# Patient Record
Sex: FEMALE | Race: WHITE
[De-identification: names, ages, dates, MRNs, and addresses within clinical notes are randomized per-mention and may not be internally consistent; named-entity substitution may affect disease eponyms.]

---

## 2018-07-27 ENCOUNTER — HOSPITAL ENCOUNTER (EMERGENCY)
Dept: HOSPITAL 83 - ED | Age: 23
Discharge: HOME | End: 2018-07-27
Payer: COMMERCIAL

## 2018-07-27 VITALS — WEIGHT: 170 LBS | HEIGHT: 61.97 IN | BODY MASS INDEX: 31.28 KG/M2
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
## 2018-07-27 DIAGNOSIS — S22.32XA: Primary | ICD-10-CM
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
## 2018-07-27 DIAGNOSIS — F17.200: ICD-10-CM
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
## 2018-07-27 LAB
ALBUMIN SERPL-MCNC: 4 GM/DL (ref 3.1–4.5)
ALP SERPL-CCNC: 71 U/L (ref 45–117)
ALT SERPL W P-5'-P-CCNC: 94 U/L (ref 12–78)
APPEARANCE UR: (no result)
AST SERPL-CCNC: 116 IU/L (ref 3–35)
B-HCG SERPL-ACNC: NEGATIVE
BACTERIA #/AREA URNS HPF: (no result) /[HPF]
BASOPHILS # BLD AUTO: 0 10*3/UL (ref 0–0.1)
BASOPHILS NFR BLD AUTO: 0.3 % (ref 0–1)
BILIRUB UR QL STRIP: NEGATIVE
BUN SERPL-MCNC: 17 MG/DL (ref 7–24)
CHLORIDE SERPL-SCNC: 105 MMOL/L (ref 98–107)
COLOR UR: YELLOW
CREAT SERPL-MCNC: 1.03 MG/DL (ref 0.55–1.02)
EOSINOPHIL # BLD AUTO: 0.1 10*3/UL (ref 0–0.4)
EOSINOPHIL # BLD AUTO: 1 % (ref 1–4)
ERYTHROCYTE [DISTWIDTH] IN BLOOD BY AUTOMATED COUNT: 12.9 % (ref 0–14.5)
ETHANOL SERPL-MCNC: < 3 MG/DL (ref ?–3)
GLUCOSE UR QL: NEGATIVE
HCT VFR BLD AUTO: 45.4 % (ref 37–47)
HGB BLD-MCNC: 14.7 G/DL (ref 12–16)
HGB UR QL STRIP: (no result)
INR BLD: 0.9 (ref 2–3.5)
KETONES UR QL STRIP: (no result)
LEUKOCYTE ESTERASE UR QL STRIP: (no result)
LIPASE SERPL-CCNC: 165 U/L (ref 73–393)
LYMPHOCYTES # BLD AUTO: 2.5 10*3/UL (ref 1.3–4.4)
LYMPHOCYTES NFR BLD AUTO: 18.1 % (ref 27–41)
MCH RBC QN AUTO: 29 PG (ref 27–31)
MCHC RBC AUTO-ENTMCNC: 32.4 G/DL (ref 33–37)
MCV RBC AUTO: 89.5 FL (ref 81–99)
MONOCYTES # BLD AUTO: 0.6 10*3/UL (ref 0.1–1)
MONOCYTES NFR BLD MANUAL: 4.7 % (ref 3–9)
NEUT #: 10.2 10*3/UL (ref 2.3–7.9)
NEUT %: 75.4 % (ref 47–73)
NITRITE UR QL STRIP: NEGATIVE
NRBC BLD QL AUTO: 0 % (ref 0–0)
PH UR STRIP: 5.5 [PH] (ref 5–9)
PLATELET # BLD AUTO: 254 10*3/UL (ref 130–400)
PMV BLD AUTO: 10.6 FL (ref 9.6–12.3)
POTASSIUM SERPL-SCNC: 3.1 MMOL/L (ref 3.5–5.1)
PROT SERPL-MCNC: 8 GM/DL (ref 6.4–8.2)
RBC # BLD AUTO: 5.07 10*6/UL (ref 4.1–5.1)
SODIUM SERPL-SCNC: 139 MMOL/L (ref 136–145)
SP GR UR: >= 1.03 (ref 1–1.03)
UROBILINOGEN UR STRIP-MCNC: 0.2 E.U./DL (ref 0.2–1)
WBC #/AREA URNS HPF: (no result) WBC/HPF (ref 0–5)
WBC NRBC COR # BLD AUTO: 13.6 10*3/UL (ref 4.8–10.8)

## 2018-08-10 ENCOUNTER — HOSPITAL ENCOUNTER (EMERGENCY)
Dept: HOSPITAL 83 - ED | Age: 23
End: 2018-08-10
Payer: COMMERCIAL

## 2018-08-10 VITALS — HEIGHT: 61.97 IN | BODY MASS INDEX: 31.28 KG/M2 | WEIGHT: 170 LBS
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
## 2018-08-10 DIAGNOSIS — S20.162A: Primary | ICD-10-CM
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
## 2018-08-10 DIAGNOSIS — L03.313: ICD-10-CM

## 2019-11-13 ENCOUNTER — HOSPITAL ENCOUNTER (EMERGENCY)
Dept: HOSPITAL 83 - ED | Age: 24
Discharge: HOME | End: 2019-11-13
Payer: COMMERCIAL

## 2019-11-13 VITALS — HEIGHT: 63.98 IN | BODY MASS INDEX: 31.58 KG/M2 | WEIGHT: 185 LBS

## 2019-11-13 DIAGNOSIS — Z79.2: ICD-10-CM

## 2019-11-13 DIAGNOSIS — K64.4: Primary | ICD-10-CM

## 2019-11-13 DIAGNOSIS — F17.200: ICD-10-CM

## 2019-11-13 DIAGNOSIS — K29.70: ICD-10-CM

## 2020-02-15 ENCOUNTER — HOSPITAL ENCOUNTER (EMERGENCY)
Dept: HOSPITAL 83 - ED | Age: 25
Discharge: HOME | End: 2020-02-15
Payer: COMMERCIAL

## 2020-02-15 VITALS — HEIGHT: 61.97 IN | BODY MASS INDEX: 34.78 KG/M2 | WEIGHT: 189 LBS

## 2020-02-15 DIAGNOSIS — Z79.2: ICD-10-CM

## 2020-02-15 DIAGNOSIS — H66.91: Primary | ICD-10-CM

## 2020-02-15 DIAGNOSIS — Z79.899: ICD-10-CM

## 2020-02-15 DIAGNOSIS — H60.91: ICD-10-CM

## 2020-02-15 DIAGNOSIS — F17.200: ICD-10-CM

## 2020-05-05 ENCOUNTER — HOSPITAL ENCOUNTER (EMERGENCY)
Dept: HOSPITAL 83 - ED | Age: 25
Discharge: HOME | End: 2020-05-05
Payer: COMMERCIAL

## 2020-05-05 VITALS — HEIGHT: 61.97 IN | BODY MASS INDEX: 33.13 KG/M2 | WEIGHT: 180 LBS

## 2020-05-05 DIAGNOSIS — Z79.899: ICD-10-CM

## 2020-05-05 DIAGNOSIS — S90.451A: Primary | ICD-10-CM
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
## 2021-03-19 ENCOUNTER — HOSPITAL ENCOUNTER (OUTPATIENT)
Dept: HOSPITAL 83 - US | Age: 26
Discharge: HOME | End: 2021-03-19
Attending: NURSE PRACTITIONER
Payer: COMMERCIAL

## 2021-03-19 DIAGNOSIS — Z3A.10: ICD-10-CM

## 2021-03-19 DIAGNOSIS — Z34.81: Primary | ICD-10-CM

## 2021-04-19 ENCOUNTER — HOSPITAL ENCOUNTER (OUTPATIENT)
Dept: HOSPITAL 83 - COVID19 | Age: 26
Discharge: HOME | End: 2021-04-19
Attending: INTERNAL MEDICINE
Payer: COMMERCIAL

## 2021-04-19 DIAGNOSIS — Z20.822: Primary | ICD-10-CM

## 2021-05-14 ENCOUNTER — HOSPITAL ENCOUNTER (OUTPATIENT)
Dept: HOSPITAL 83 - US | Age: 26
End: 2021-05-14
Attending: NURSE PRACTITIONER
Payer: COMMERCIAL

## 2021-05-14 DIAGNOSIS — Z34.82: Primary | ICD-10-CM

## 2021-05-14 DIAGNOSIS — Z3A.18: ICD-10-CM

## 2021-07-15 ENCOUNTER — ROUTINE PRENATAL (OUTPATIENT)
Dept: OBGYN CLINIC | Age: 26
End: 2021-07-15
Payer: MEDICAID

## 2021-07-15 ENCOUNTER — ANCILLARY PROCEDURE (OUTPATIENT)
Dept: OBGYN CLINIC | Age: 26
End: 2021-07-15
Payer: MEDICAID

## 2021-07-15 VITALS
HEIGHT: 63 IN | DIASTOLIC BLOOD PRESSURE: 75 MMHG | BODY MASS INDEX: 37.21 KG/M2 | WEIGHT: 210 LBS | SYSTOLIC BLOOD PRESSURE: 116 MMHG | HEART RATE: 89 BPM

## 2021-07-15 DIAGNOSIS — O99.340 DEPRESSION AFFECTING PREGNANCY: ICD-10-CM

## 2021-07-15 DIAGNOSIS — O36.5920 POOR FETAL GROWTH AFFECTING MANAGEMENT OF MOTHER IN SECOND TRIMESTER, SINGLE OR UNSPECIFIED FETUS: ICD-10-CM

## 2021-07-15 DIAGNOSIS — F12.90 MARIJUANA USE: ICD-10-CM

## 2021-07-15 DIAGNOSIS — F32.A DEPRESSION AFFECTING PREGNANCY: ICD-10-CM

## 2021-07-15 DIAGNOSIS — Z92.89 HISTORY OF BLOOD TRANSFUSION: ICD-10-CM

## 2021-07-15 DIAGNOSIS — Z80.41 FAMILY HISTORY OF OVARIAN CANCER: ICD-10-CM

## 2021-07-15 DIAGNOSIS — O09.292 HISTORY OF OLIGOHYDRAMNIOS IN PRIOR PREGNANCY, CURRENTLY PREGNANT IN SECOND TRIMESTER: ICD-10-CM

## 2021-07-15 DIAGNOSIS — Z3A.27 27 WEEKS GESTATION OF PREGNANCY: Primary | ICD-10-CM

## 2021-07-15 DIAGNOSIS — O21.9 NAUSEA AND VOMITING DURING PREGNANCY: ICD-10-CM

## 2021-07-15 LAB
GLUCOSE URINE, POC: NEGATIVE
PROTEIN UA: POSITIVE

## 2021-07-15 PROCEDURE — 81002 URINALYSIS NONAUTO W/O SCOPE: CPT | Performed by: OBSTETRICS & GYNECOLOGY

## 2021-07-15 PROCEDURE — 99203 OFFICE O/P NEW LOW 30 MIN: CPT | Performed by: OBSTETRICS & GYNECOLOGY

## 2021-07-15 PROCEDURE — 76805 OB US >/= 14 WKS SNGL FETUS: CPT | Performed by: OBSTETRICS & GYNECOLOGY

## 2021-07-15 PROCEDURE — 76820 UMBILICAL ARTERY ECHO: CPT | Performed by: OBSTETRICS & GYNECOLOGY

## 2021-07-15 PROCEDURE — 99244 OFF/OP CNSLTJ NEW/EST MOD 40: CPT | Performed by: OBSTETRICS & GYNECOLOGY

## 2021-07-15 RX ORDER — LANOLIN ALCOHOL/MO/W.PET/CERES
25 CREAM (GRAM) TOPICAL DAILY
Status: ON HOLD | COMMUNITY
End: 2021-10-18 | Stop reason: HOSPADM

## 2021-07-15 RX ORDER — SERTRALINE HYDROCHLORIDE 20 MG/ML
10 SOLUTION ORAL DAILY
COMMUNITY

## 2021-07-15 NOTE — PROGRESS NOTES
July 15, 2021    MD Tayler Quezada [address]     RE:  Concepcion Covington  : 1995   AGE: 32 y.o. This report has been created using voice recognition software. It may contain errors which are inherent in voice recognition technology. Dear Dr. Beto Owen:    I had the pleasure of meeting with Ms. Olvera for a consultation. As you know, Ms. Olvera is a 32 y.o.  at 27w4d (LMP = 10 wk US) who was referred to our office for counseling secondary to a history of a low amniotic fluid index. The patient's medical records and laboratory workup were reviewed. The patient's history was reviewed and outlined below. Today, Ms. Olvera reports that she feels well. She notes good fetal movement and denies any symptoms of leaking of fluid, vaginal bleeding, and/or contractions. She had a fetal ultrasound that was notable for the following. There is a single intrauterine gestation in a cephalic presentation with a heart rate of 137 beats per minute. The placenta is posterior. The composite gestational age is 34w1d. The estimated fetal weight is at the 28th percentile. The femur length is at the 60th percentile. The amniotic fluid index is 11.4 cm. The umbilical artery S/D is normal.          PAST OBSTETRICAL HISTORY:    2014 -- (GRACIELA 14) 38w5d  of 5lb 15 male ([address] [place] [address]). She was induced at 38w5d for a low ALEX. She had bleeding in the first trimester. She had a postpartum hemorrhage likely secondary to uterine atony for which she required a blood transfusion. She recalls receiving 2 units. She denies having any other complications with the pregnancy, delivery, and/or postpartum recovery. She reports that her son is living and well. All pregnancies are with the same partner. PAST GYNECOLOGICAL  HISTORY:  Negative for abnormal pap smears. Negative for sexually transmitted diseases. Negative for cervical LEEP / conization /cryosurgery.     Negative for uterine surgery. Negative for ovarian or tubal surgery. PAST MEDICAL HISTORY:    MEDICATIONS:  Vitamin B6, 25 mg daily  Unisom, daily  Prenatal vitamin  Zoloft, 20 mg daily    ILLNESSES:  Anxiety/depression    BLOOD TRANSFUSION:  Patient reports a history of a blood transfusion associated with her first delivery. IMMUNIZATIONS:  Up-to-date    SURGERIES:  None    HOSPITALIZATIONS:  Childbirth    ALLERGIES:  She denies having any medication, latex, and or shellfish allergies    SOCIAL HISTORY:  She is currently smoking one half a pack of cigarettes per day, she has cut back from 1 pack/day. She has also been using marijuana for appetite and nausea. She denies any alcohol use. She is a stay-at-home mother. FAMILY MEDICAL HISTORY:   Negative for congenital abnormalities, autism, genetic disease and mental retardation, not listed above. Cancer Mother -- ovarian cancer; FOB father -- Hodgkin's lymphoma; MGA -- breast cancer  CAD Father  CVA None  Congenital anomalies FOB son has cerebral palsy, and pectus excavatum (indented)  DM None  DVT Mother related to a PICC line and chemotherapy  Bleeding disorders None  Autoimmune disorders None    Review of Systems :   CONSTITUTIONAL : No fever, no chills   HEENT : No headache, no visual changes, no rhinorrhea, no sore throat   CARDIOVASCULAR : No pain, no palpitations, no edema   RESPIRATORY : No pain, no shortness of breath   GASTROINTESTINAL : No N/V, no D/C, no abdominal pain   GENITOURINARY : No dysuria, hematuria and no incontinence   MUSCULOSKELETAL : No myalgia, No back pain  NEUROLOGICAL : No numbness, no tingling, no tremors. No history of seizures  ALL OTHER SYSTEMS WERE REPORTED AS NEGATIVE.     PERTINENT PHYSICAL EXAMINATION:   /75 (Position: Sitting)   Pulse 89   Ht 5' 3\" (1.6 m)   Wt 210 lb (95.3 kg)   LMP 01/03/2021   BMI 37.20 kg/m²   Urine dipstick:   Negative for Glucose    Trace for Albumin    GENERAL:   The patient is a well developed, female who is alert cooperative and oriented times three in no acute distress. HEENT:  Normo cephalic and atraumatic. No facial edema. ABDOMEN:   Her uterus is gravid. She had no complaint of abdominal pain or tenderness. EXTREMITIES:  No peripheral edema is noted. A fetal ultrasound assessment was performed today. A report is enclosed for your review. Assessment & Plan:  32 y.o.  at 27w4d (LMP = 8 WK us) with:    1. Dating Review -- The patent reports that her mirena was pulled in 2020. Her menses were irregular initially after having the IUD removed. She reports that was sure of her LMP. The patient's first ultrasound was on 3/19/2021. The crown-rump length was consistent with 10 weeks 2 days and the GRACIELA was 10/13/2021. She had a follow-up ultrasound at 18 weeks 2 days. The composite gestational age was consistent with an GRACIELA of 10/13/2021. Thus, the patient's pregnancy is dated by a last menstrual period that I agreed with a 10-week ultrasound. 2.  History of low ALXE, improved -- The patient's prenatal records were reviewed. She had an ultrasound on 2021. The composite gestational age at this time was 18 weeks 2 days. The ALEX was noted to be low at 8.2 cm. The patient denied having any symptoms of vaginal bleeding or loss of fluid. Today but the patient's first ultrasound since 18 weeks gestation. The overall composite gestational age is 26 weeks 2 days and the ALEX was normal at 11.2 cm. The reassuring findings were reviewed with the patient. Given that the patient's prior pregnancy was complicated by oligohydramnios, she is at increased risk for having a low ALEX with this pregnancy. Thus, increased surveillance is recommended, please see below. 3.  Short femur length -- Today's ultrasound was reviewed with patient. The femur length was at the 6 percentile. . Femur length to abdominal circumference ratio was normal at 0.22.  The Again, there are risks associated with untreated depression including poor fetal growth and  delivery. The patient indicated that at this time, she did not feel that she could safely taper off of the medication. Thus, the benefit of treatment would outweigh risk at this time. Dose adjustment should be based on patient symptoms. Pregnant women exposed to antidepressants during pregnancy are encouraged to enroll in the [facility] Pregnancy Registry for Antidepressants (NPRAD). Women 25to 39years of age or their health care providers may contact the registry by calling [phone]. Enrollment should be done as early in pregnancy as possible. She was also counseled regarding the risks and benefits of these medications during breast-feeding. Sertraline and its active metabolite are present in breastmilk. The relative infant dose (RID) is estimated to be 0.5-3%. Typically, breast-feeding is considered acceptable when the RID is <10%. When the RID is >25%, breast-feeding should be avoided. Some studies have reported adverse events in breast-feeding infants following maternal use of sertraline. Incidence of mothers taking psychotropic medication should be monitored daily for changes in sleep, feeding patterns, and/or behavior. Infant growth and development should be monitored per protocol. SSRI use during pregnancy may cause delayed lactation. Sertraline is a first-line agent for postpartum depression in women who have breast-feeding. Women whose symptoms are well controlled with sertraline during pregnancy can continue use while breast-feeding if there are no other contraindications. Per the , the decision to breast-feed during therapy should take into account risks and benefits and be individualized to the patient. 7. History of a blood transfusion -- The patient reports a history of a blood transfusion following her delivery.   She reports that she had significant bleeding have any other developmental delays. He does not have any type of genetic diagnosis, per the patient's partner. Counseling was provided. Cerebral palsy (CP) is a heterogeneous group of conditions involving permanent, non progressive motor dysfunction that affects muscle tone, posture, and/or movement. CP is not typically progressive, however, the clinical expression may change over time as the central nervous system matures. Additional symptoms such as perception issues, intellectual disability, behavioral issues, seizure disorders, and developmental delays may occur with the primary motor abnormality. CP affects approximately 1000 live births. The prevalence is much higher in  infants and low birth weight infants. The etiology is multifactorial.  Risk factors for CP included  birth, low birth weight, maternal alcohol consumption, maternal smoking, obesity, congenital infections, antepartum hemorrhage, severe placental dysfunction, birth injury, genetic factors, and multiple gestation. Genetic conditions were not thought to be a cause of CP, however, recent studies have suggested a genetic contribution to CP risk. The identification of a genetic diagnosis does not replace the diagnosis of CP. This family history should be relayed to the pediatrician who cares for their child(mary anne). 6.  Family history of pectus excavatum -- The patient's family history is notable in that her partner's son was born with pectus excavated him. Her partner does not have this condition. As far as the patient knows, he does not have any associated genetic diagnosis or connective tissue disorder. She was counseled that this condition is associated with several genetic and connective tissue disorders.   In the absence of an associated diagnosis, it is generally sporadic and the exact etiology is thought to be multifactorial.  There have been reports of familial inheritance occurring in an autosomal dominant, autosomal recessive, and x-linked fashion. The patient does not believe that she has any other family members with this condition. Thus, in the absence of a known genetic disorder or connective tissue disease, I would not expect the risk to be significantly increased above that of the general population. However, if there is a genetic component that has not be identified, the recurrence risk could be as high as 50%. The patient was counseled to relay this family history to the pediatrician caring for her child as the condition can present later in life (infancy, at the time of puberty). 12.  Nausea and vomiting of pregnancy  -- The patients reports having nausea the entire pregnancy. She initially lost 8 to 10 pounds. She has gained this weight back. She has been smoking marijuana to improve her appetite. The patient reports daily symptoms of nausea and decreased appetite. She occasionally has emesis. She denies any signs of symptoms of dehydration. Precautions were reviewed. A baseline nutrition panel (CBC, CMP, magnesium, ferritin, folate, vitamin B12, vitamin D 25 [place]) was recommended. This was ordered today. She is using vitamin B6 and Unisom with some relief. She declined any additional treatments today. She was counseled to stop using marijuana for appetite improvement. --The patient was advised to call if she has any increased vaginal discharge, vaginal bleeding, contractions, abdominal pain, back pain or any new significant symptomatology prior to her next visit. I advised her that these are signs and symptoms of cervical change and require follow-up assessment when they occur. --I requested the patient return for a follow-up assessment in 3 weeks unless there is a clinical reason for her to return prior to that time. She is to call if she has any problems or questions prior to her next visit.  Further evaluation and management will be dependent on her clinical presentation and the results of her testing. --The patient is to continue to follow with you in your office for ongoing obstetric care. --The total time spent on today's visit was 60 minutes. This included preparation for the visit (i.e. reviewing prior external notes and test results), performance of a medically appropriate history and examination, counseling, orders for medications, tests or other procedures, and coordination of care. Greater than 50% of the time was spent face-to-face with the patient. This time is exclusive of procedures performed. I answered all of  the patient's questions to her satisfaction. I asked her to call if she had any additional questions prior to her next visit. --If you have any questions regarding her management, please contact me at your convenience and thank you for allowing me to participate in her care. Sincerely,        John Ware MD    [address] [place] [address]  [phone]    *All or parts of this note may have been generated using a voice recognition program. There may be typo, grammar, or Word substitution errors that have escaped my review of this note.

## 2021-07-15 NOTE — PATIENT INSTRUCTIONS
Please arrive for your scheduled appointment at least 15 minutes early with your actual insurance card+ a photo ID. Also if you need any refills ordered or have questions, it may take up 48 hours to reply. Please allow ample time for your refills. Call me when you use last refill. Thank you for your cooperation. Any questions contact Samanta at [phone]. If you are experiencing an emergency and need immediate help, call 911 or go to go emergency room or labor and delivery. if you are sick, not feeling well or have an infectious process going on please reschedule your appointment by calling [phone]. Also if any family members are not feeling well, please do not bring them to your appointment. We appreciate your cooperation. We are doing this in order to protect our pregnant mothers+ their babies. Call your primary obstetrician with bleeding, leaking of fluid, abdominal tenderness, headache, blurry vision, epigastric pain and increased urinary frequency. Patient Education        Weeks 26 to 30 of Your Pregnancy: Care Instructions  Overview     You are now entering your last trimester of pregnancy. Your baby is growing quickly. Mo Bolanos probably feel your baby moving around more often. Your doctor may ask you to count your baby's kicks. Your back may ache as your body gets used to your baby's size and length. If you haven't already had the Tdap shot during this pregnancy, talk to your doctor about getting it. It will help protect your  against pertussis infection. During this time, it's important to take care of yourself and pay attention to what your body needs. If you feel sexual, you can explore ways to be close with your partner that match your comfort and desire. Follow-up care is a key part of your treatment and safety. Be sure to make and go to all appointments, and call your doctor if you are having problems.  It's also a good idea to know your test results and keep a list of the medicines you take.  How can you care for yourself at home? Take it easy at work  · Take frequent breaks. If possible, stop working when you are tired, and rest during your lunch hour. · Take bathroom breaks every 2 hours. · Change positions often. If you sit for long periods, stand up and walk around. · When you stand for a long time, keep one foot on a low stool with your knee bent. After standing a lot, sit with your feet up. · Avoid fumes, chemicals, and tobacco smoke. Be sexual in your own way  · Having sex during pregnancy is okay, unless your doctor tells you not to. · You may be very interested in sex, or you may have no interest at all. · Your growing belly can make it hard to find a good position during intercourse. [place] and explore. · You may get cramps in your uterus when your partner touches your breasts. · A back rub may relieve the backache or cramps that sometimes follow orgasm. Learn about  labor  · Watch for signs of  labor. You may be going into labor if:  ? You have menstrual-like cramps, with or without nausea. ? You have about 6 or more contractions in 1 hour, even after you have had a glass of water and are resting. ? You have a low, dull backache that does not go away when you change your position. ? You have pain or pressure in your pelvis that comes and goes in a pattern. ? You have intestinal cramping or flu-like symptoms, with or without diarrhea.  ? You notice an increase or change in your vaginal discharge. Discharge may be heavy, mucus-like, watery, or streaked with blood. ? Your water breaks. · If you think you have  labor:  ? Drink 2 or 3 glasses of water or juice. Not drinking enough fluids can cause contractions. ? Stop what you are doing, and empty your bladder. Then lie down on your left side for at least 1 hour. ? While lying on your side, find your breast bone. Put your fingers in the soft spot just below it.  Move your fingers down toward your belly button to find the top of your uterus. Check to see if it is tight. ? Contractions can be weak or strong. Record your contractions for an hour. Time a contraction from the start of one contraction to the start of the next one.  ? Single or several strong contractions without a pattern are called [place] contractions. They are practice contractions but not the start of labor. They often stop if you change what you are doing. ? Call your doctor if you have regular contractions. Where can you learn more? Go to https://Ambio Healthleobardoeb.[employer]. org and sign in to your [employer] account. Enter I319 in the [employer] box to learn more about \"Weeks 26 to 30 of Your Pregnancy: Care Instructions. \"     If you do not have an account, please click on the \"Sign Up Now\" link. Current as of: October 8, 2020               Content Version: 12.9  © 2006-2021 [employer]. Care instructions adapted under license by [facility] ([facility]). If you have questions about a medical condition or this instruction, always ask your healthcare professional. [place] [address] any warranty or liability for your use of this information. Patient Education        Learning About When to Call Your Doctor During Pregnancy (After 20 Weeks)  Your Care Instructions  It's common to have concerns about what might be a problem during pregnancy. Although most pregnant women don't have any serious problems, it's important to know when to call your doctor if you have certain symptoms or signs of labor. These are general suggestions. Your doctor may give you some more information about when to call. When to call your doctor (after 20 weeks)  Call 911  anytime you think you may need emergency care. For example, call if:  · You have severe vaginal bleeding. · You have sudden, severe pain in your belly. · You passed out (lost consciousness). · You have a seizure.   · You see or feel the umbilical cord.  · You think you are about to deliver your baby and can't make it safely to the hospital.  Call your doctor now or seek immediate medical care if:  · You have vaginal bleeding. · You have belly pain. · You have a fever. · You have symptoms of preeclampsia, such as:  ? Sudden swelling of your face, hands, or feet. ? New vision problems (such as dimness, blurring, or seeing spots). ? A severe headache. · You have a sudden release of fluid from your vagina. (You think your water broke.)  · You think that you may be in labor. This means that you've had at least 6 contractions in an hour. · You notice that your baby has stopped moving or is moving much less than normal.  · You have symptoms of a urinary tract infection. These may include:  ? Pain or burning when you urinate. ? A frequent need to urinate without being able to pass much urine. ? Pain in the flank, which is just below the rib cage and above the waist on either side of the back. ? Blood in your urine. Watch closely for changes in your health, and be sure to contact your doctor if:  · You have vaginal discharge that smells bad. · You have skin changes, such as:  ? A rash. ? Itching. ? Yellow color to your skin. · You have other concerns about your pregnancy. If you have labor signs at 37 weeks or more  If you have signs of labor at 37 weeks or more, your doctor may tell you to call when your labor becomes more active. Symptoms of active labor include:  · Contractions that are regular. · Contractions that are less than 5 minutes apart. · Contractions that are hard to talk through. Follow-up care is a key part of your treatment and safety. Be sure to make and go to all appointments, and call your doctor if you are having problems. It's also a good idea to know your test results and keep a list of the medicines you take. Where can you learn more? Go to https://kristy.[employer]. org and sign in to your [employer] account.  Enter N531 in the [facility] box to learn more about \"Learning About When to Call Your Doctor During Pregnancy (After 20 Weeks). \"     If you do not have an account, please click on the \"Sign Up Now\" link. Current as of: October 8, 2020               Content Version: 12.9  © 1260-7250 [employer]. Care instructions adapted under license by [facility] ([facility]). If you have questions about a medical condition or this instruction, always ask your healthcare professional. [address] any warranty or liability for your use of this information.

## 2021-07-15 NOTE — LETTER
[facility]  [address] [place] [address], [address] [place] [address]  [phone]  Phone: [phone]  Fax: [phone]           Pete Alarcon MD      July 15, 2021    Patient: Dona Conde   MR Number: [id number]   YOB: 1995   Date of Visit: 7/15/2021       Dear Dr. Dillan Soto:    Thank you for referring Dona Conde to me for evaluation/treatment. Below are the relevant portions of my assessment and plan of care. If you have questions, please do not hesitate to call me. I look forward to following Kermit Del Castillo along with you. Sincerely,        Pete Alarcon MD    CC providers:    No Recipients                           July 15, 2021    Matt Gonzalez MD  [address] [place] [address]     RE:  Bj Galindo  : 1995   AGE: 32 y.o. This report has been created using voice recognition software. It may contain errors which are inherent in voice recognition technology. Dear Dr. Dillan Soto:    I had the pleasure of meeting with Ms. Olvera for a consultation. As you know, Ms. Olvera is a 32 y.o.  at 27w4d (LMP = 10 wk US) who was referred to our office for counseling secondary to a history of a low amniotic fluid index. The patient's medical records and laboratory workup were reviewed. The patient's history was reviewed and outlined below. Today, Ms. Olvera reports that she feels well. She notes good fetal movement and denies any symptoms of leaking of fluid, vaginal bleeding, and/or contractions. She had a fetal ultrasound that was notable for the following. There is a single intrauterine gestation in a cephalic presentation with a heart rate of 137 beats per minute. The placenta is posterior. The composite gestational age is 34w1d. The estimated fetal weight is at the 28th percentile. The femur length is at the 60th percentile. The amniotic fluid index is 11.4 cm.   The umbilical artery S/D is normal.          PAST OBSTETRICAL HISTORY:    2014 -- (GRACIELA 14) 38w5d  of 5lb 15 male ([address]). She was induced at 38w5d for a low ALEX. She had bleeding in the first trimester. She had a postpartum hemorrhage likely secondary to uterine atony for which she required a blood transfusion. She recalls receiving 2 units. She denies having any other complications with the pregnancy, delivery, and/or postpartum recovery. She reports that her son is living and well. All pregnancies are with the same partner. PAST GYNECOLOGICAL  HISTORY:  Negative for abnormal pap smears. Negative for sexually transmitted diseases. Negative for cervical LEEP / conization /cryosurgery. Negative for uterine surgery. Negative for ovarian or tubal surgery. PAST MEDICAL HISTORY:    MEDICATIONS:  Vitamin B6, 25 mg daily  Unisom, daily  Prenatal vitamin  Zoloft, 20 mg daily    ILLNESSES:  Anxiety/depression    BLOOD TRANSFUSION:  Patient reports a history of a blood transfusion associated with her first delivery. IMMUNIZATIONS:  Up-to-date    SURGERIES:  None    HOSPITALIZATIONS:  Childbirth    ALLERGIES:  She denies having any medication, latex, and or shellfish allergies    SOCIAL HISTORY:  She is currently smoking one half a pack of cigarettes per day, she has cut back from 1 pack/day. She has also been using marijuana for appetite and nausea. She denies any alcohol use. She is a stay-at-home mother. FAMILY MEDICAL HISTORY:   Negative for congenital abnormalities, autism, genetic disease and mental retardation, not listed above.      Cancer Mother -- ovarian cancer; FOB father -- Hodgkin's lymphoma; MGA -- breast cancer  CAD Father  CVA None  Congenital anomalies FOB son has cerebral palsy, and pectus excavatum (indented)  DM None  DVT Mother related to a PICC line and chemotherapy  Bleeding disorders None  Autoimmune disorders None    Review of Systems :   CONSTITUTIONAL : No fever, no chills   HEENT : No headache, no visual changes, no rhinorrhea, no sore throat   CARDIOVASCULAR : No pain, no palpitations, no edema   RESPIRATORY : No pain, no shortness of breath   GASTROINTESTINAL : No N/V, no D/C, no abdominal pain   GENITOURINARY : No dysuria, hematuria and no incontinence   MUSCULOSKELETAL : No myalgia, No back pain  NEUROLOGICAL : No numbness, no tingling, no tremors. No history of seizures  ALL OTHER SYSTEMS WERE REPORTED AS NEGATIVE. PERTINENT PHYSICAL EXAMINATION:   /75 (Position: Sitting)   Pulse 89   Ht 5' 3\" (1.6 m)   Wt 210 lb (95.3 kg)   LMP 2021   BMI 37.20 kg/m²   Urine dipstick:   Negative for Glucose    Trace for Albumin    GENERAL:   The patient is a well developed, female who is alert cooperative and oriented times three in no acute distress. HEENT:  Normo cephalic and atraumatic. No facial edema. ABDOMEN:   Her uterus is gravid. She had no complaint of abdominal pain or tenderness. EXTREMITIES:  No peripheral edema is noted. A fetal ultrasound assessment was performed today. A report is enclosed for your review. Assessment & Plan:  32 y.o.  at 27w4d (LMP = 8 WK us) with:    1. Dating Review -- The patent reports that her mirena was pulled in 2020. Her menses were irregular initially after having the IUD removed. She reports that was sure of her LMP. The patient's first ultrasound was on 3/19/2021. The crown-rump length was consistent with 10 weeks 2 days and the GRACIELA was 10/13/2021. She had a follow-up ultrasound at 18 weeks 2 days. The composite gestational age was consistent with an GRACIELA of 10/13/2021. Thus, the patient's pregnancy is dated by a last menstrual period that I agreed with a 10-week ultrasound. 2.  History of low ALEX, improved -- The patient's prenatal records were reviewed. She had an ultrasound on 2021. The composite gestational age at this time was 18 weeks 2 days. The ALEX was noted to be low at 8.2 cm.   The patient denied THC while pregnant. Random urine drug screens should be monitored during pregnancy. 5.  History of oligohydramnios -- The patient's first pregnancy was complicated by oligohydramnios which was delivered at 38 weeks 5 days. She denies any associated PROM. The patient was counseled that given this history, she would be increased risk for recurrent oligohydramnios. Given this history, increased surveillance is recommended. Fetal growth should be monitored serially, every 3-4 weeks beginning a 24-26 weeks' gestation. The amniotic fluid can be assessed during the studies. The patient could also start taking a low-dose aspirin in reducing the risk for placental insufficiency. She can take 81 mg daily. 6.  Anxiety/Depression -- The patient reports that she was diagnosed several years ago. She follows with her PCP. She is taking Zoloft. She reports that her mood has been fine. She does not see a counselor. She denies having any suicidal or homicidal ideations. The patient was counseled regarding the risks of depression and anxiety in pregnancy including worsening symptoms during pregnancy and postpartum, poor fetal growth and  birth. The risks and benefits of treatment were also reviewed. I recommend continuing to monitor the patient's mood throughout pregnancy and postpartum. With recurrent or worsening symptoms, a referral should be made for counseling. Additionally, medication dosing may need to be adjusted. Because of the association of thyroid disease and depression, a baseline thyroid screening is recommended with a TSH, free T4, and thyroid peroxidase antibody. Additionally, depression can be associated with nutritional deficiencies, thus, a baseline nutrition panel is also recommended (CBC, CMP, magnesium, ferritin, folate, vitamin B12, vitamin D 25OH). Specific to Zoloft, it dose cross the placenta and there is fetal exposure.   First trimester exposure is not thought to cause an increased risk for major birth defects. There may be an association with congenital heart defects. Exposure in the late third trimester is associated with  respiratory distress, cyanosis, apnea, seizures, temperature instability, feeding difficult, emesis, hypoglycemia, hypo/hypertonia, hyperreflexia, irritability, constant crying, and/or tremor. Symptoms may be due to withdrawal.  Persistent pulmonary hypertension of the  (PPHN) has also been reported with SSRI exposure. The long-term effects of in utero SSRI exposure on infant development and behavior are not known. As with any medication, the risk and benefit of use must be weighed. Again, there are risks associated with untreated depression including poor fetal growth and  delivery. The patient indicated that at this time, she did not feel that she could safely taper off of the medication. Thus, the benefit of treatment would outweigh risk at this time. Dose adjustment should be based on patient symptoms. Pregnant women exposed to antidepressants during pregnancy are encouraged to enroll in the [facility] Pregnancy Registry for Antidepressants (NPRAD). Women 25to 39years of age or their health care providers may contact the registry by calling [phone]. Enrollment should be done as early in pregnancy as possible. She was also counseled regarding the risks and benefits of these medications during breast-feeding. Sertraline and its active metabolite are present in breastmilk. The relative infant dose (RID) is estimated to be 0.5-3%. Typically, breast-feeding is considered acceptable when the RID is <10%. When the RID is >25%, breast-feeding should be avoided. Some studies have reported adverse events in breast-feeding infants following maternal use of sertraline. Incidence of mothers taking psychotropic medication should be monitored daily for changes in sleep, feeding patterns, and/or behavior.   Infant growth and development should be monitored per protocol. SSRI use during pregnancy may cause delayed lactation. Sertraline is a first-line agent for postpartum depression in women who have breast-feeding. Women whose symptoms are well controlled with sertraline during pregnancy can continue use while breast-feeding if there are no other contraindications. Per the , the decision to breast-feed during therapy should take into account risks and benefits and be individualized to the patient. 7. History of a blood transfusion -- The patient reports a history of a blood transfusion following her delivery. She reports that she had significant bleeding immediately following delivery and required 2 units of packed red blood cells. Given this history, baseline screening for hepatitis c is recommended. Orders were placed today. 8.  History of postpartum hemorrhage -- The patient reported a history of postpartum hemorrhage related to which she describes as uterine atony immediately following her first delivery. The patient was counseled that she may be an increased risk for recurrent bleeding at the time of delivery. The delivery provider should be made aware of this history so precautions can be taken. A postpartum hemorrhage kit should be immediately available. Additionally, I would recommend obtaining a type and cross when the patient is admitted for delivery. 9.  Tobacco use in pregnancy -- The patient reports she is smoking 10 cigarettes per day. She has cut back from 1 pack per day. She was counseled regarding the increased risks of smoking in pregnancy including poor fetal growth, placental abruption, PPROM/ birth, and fetal loss. Additional  risks were reviewed including asthma, allergies, infection, and an association with SIDS. Various techniques for cutting back and quitting were reviewed. She was urged to quit smoking cigarettes.         9.  Family history of cancer -- The patient's family history was reviewed and notable in that her mother was recently diagnosed with ovarian cancer. The patient was unsure if her mother had genetic testing. Given this history, genetic counseling is indicated. She was counseled that if interested, your office could refer her for counseling to determine if genetic screening/testing is indicated. The patient expressed verbal understanding of this counseling. 8.  Family history of cerebral palsy -- The patient reports that her partner's son has cerebral palsy. He was born . He is able to walk with braces. He also has pectus excavated him. He is otherwise doing well and does not have any other developmental delays. He does not have any type of genetic diagnosis, per the patient's partner. Counseling was provided. Cerebral palsy (CP) is a heterogeneous group of conditions involving permanent, non progressive motor dysfunction that affects muscle tone, posture, and/or movement. CP is not typically progressive, however, the clinical expression may change over time as the central nervous system matures. Additional symptoms such as perception issues, intellectual disability, behavioral issues, seizure disorders, and developmental delays may occur with the primary motor abnormality. CP affects approximately 1000 live births. The prevalence is much higher in  infants and low birth weight infants. The etiology is multifactorial.  Risk factors for CP included  birth, low birth weight, maternal alcohol consumption, maternal smoking, obesity, congenital infections, antepartum hemorrhage, severe placental dysfunction, birth injury, genetic factors, and multiple gestation. Genetic conditions were not thought to be a cause of CP, however, recent studies have suggested a genetic contribution to CP risk. The identification of a genetic diagnosis does not replace the diagnosis of CP.       This family history should be relayed to the pediatrician who cares for their child(mary anne). 6.  Family history of pectus excavatum -- The patient's family history is notable in that her partner's son was born with pectus excavated him. Her partner does not have this condition. As far as the patient knows, he does not have any associated genetic diagnosis or connective tissue disorder. She was counseled that this condition is associated with several genetic and connective tissue disorders. In the absence of an associated diagnosis, it is generally sporadic and the exact etiology is thought to be multifactorial.  There have been reports of familial inheritance occurring in an autosomal dominant, autosomal recessive, and x-linked fashion. The patient does not believe that she has any other family members with this condition. Thus, in the absence of a known genetic disorder or connective tissue disease, I would not expect the risk to be significantly increased above that of the general population. However, if there is a genetic component that has not be identified, the recurrence risk could be as high as 50%. The patient was counseled to relay this family history to the pediatrician caring for her child as the condition can present later in life (infancy, at the time of puberty). 12.  Nausea and vomiting of pregnancy  -- The patients reports having nausea the entire pregnancy. She initially lost 8 to 10 pounds. She has gained this weight back. She has been smoking marijuana to improve her appetite. The patient reports daily symptoms of nausea and decreased appetite. She occasionally has emesis. She denies any signs of symptoms of dehydration. Precautions were reviewed. A baseline nutrition panel (CBC, CMP, magnesium, ferritin, folate, vitamin B12, vitamin D 25 OH) was recommended. This was ordered today. She is using vitamin B6 and Unisom with some relief. She declined any additional treatments today.   She was counseled to stop using marijuana for appetite improvement. --The patient was advised to call if she has any increased vaginal discharge, vaginal bleeding, contractions, abdominal pain, back pain or any new significant symptomatology prior to her next visit. I advised her that these are signs and symptoms of cervical change and require follow-up assessment when they occur. --I requested the patient return for a follow-up assessment in 3 weeks unless there is a clinical reason for her to return prior to that time. She is to call if she has any problems or questions prior to her next visit. Further evaluation and management will be dependent on her clinical presentation and the results of her testing. --The patient is to continue to follow with you in your office for ongoing obstetric care. --The total time spent on today's visit was 60 minutes. This included preparation for the visit (i.e. reviewing prior external notes and test results), performance of a medically appropriate history and examination, counseling, orders for medications, tests or other procedures, and coordination of care. Greater than 50% of the time was spent face-to-face with the patient. This time is exclusive of procedures performed. I answered all of  the patient's questions to her satisfaction. I asked her to call if she had any additional questions prior to her next visit. --If you have any questions regarding her management, please contact me at your convenience and thank you for allowing me to participate in her care. Sincerely,        Driss Fernandes MD    [address] [place] [address]  [phone]    *All or parts of this note may have been generated using a voice recognition program. There may be typo, grammar, or Word substitution errors that have escaped my review of this note.

## 2021-07-15 NOTE — PROGRESS NOTES
Patient here for prenatal ultrasound as new OB  States having irregular cramping mostly at night.   Denies any bleeding or LOF  +FM noted

## 2021-07-18 PROBLEM — F32.A DEPRESSION AFFECTING PREGNANCY: Status: ACTIVE | Noted: 2021-07-18

## 2021-07-18 PROBLEM — Z92.89 HISTORY OF BLOOD TRANSFUSION: Status: ACTIVE | Noted: 2021-07-18

## 2021-07-18 PROBLEM — O36.5920 POOR FETAL GROWTH AFFECTING MANAGEMENT OF MOTHER IN SECOND TRIMESTER: Status: ACTIVE | Noted: 2021-07-18

## 2021-07-18 PROBLEM — O99.340 DEPRESSION AFFECTING PREGNANCY: Status: ACTIVE | Noted: 2021-07-18

## 2021-07-18 PROBLEM — O21.9 NAUSEA AND VOMITING DURING PREGNANCY: Status: ACTIVE | Noted: 2021-07-18

## 2021-07-18 PROBLEM — Z80.41 FAMILY HISTORY OF OVARIAN CANCER: Status: ACTIVE | Noted: 2021-07-18

## 2021-07-18 PROBLEM — F12.90 MARIJUANA USE: Status: ACTIVE | Noted: 2021-07-18

## 2021-07-18 PROBLEM — O09.292 HISTORY OF OLIGOHYDRAMNIOS IN PRIOR PREGNANCY, CURRENTLY PREGNANT IN SECOND TRIMESTER: Status: ACTIVE | Noted: 2021-07-18

## 2021-07-23 ENCOUNTER — TELEPHONE (OUTPATIENT)
Dept: OBGYN CLINIC | Age: 26
End: 2021-07-23

## 2021-07-23 NOTE — TELEPHONE ENCOUNTER
Left on vm for Emiliano to call M M. Dr. Patricia Babin asked for her to return for 3 wk fup appt week of August 2. If Dr. Patricia Babin is not in the office for appointments schedule her with one of the other physicians. 2nd call - lm on voicemail to call MFM to schedule appointment.

## 2021-07-27 ENCOUNTER — TELEPHONE (OUTPATIENT)
Dept: OBGYN CLINIC | Age: 26
End: 2021-07-27

## 2021-08-06 ENCOUNTER — ANCILLARY PROCEDURE (OUTPATIENT)
Dept: OBGYN CLINIC | Age: 26
End: 2021-08-06
Payer: MEDICAID

## 2021-08-06 ENCOUNTER — ROUTINE PRENATAL (OUTPATIENT)
Dept: OBGYN CLINIC | Age: 26
End: 2021-08-06
Payer: MEDICAID

## 2021-08-06 ENCOUNTER — HOSPITAL ENCOUNTER (OUTPATIENT)
Age: 26
Discharge: HOME | End: 2021-08-06
Payer: MEDICAID

## 2021-08-06 VITALS
BODY MASS INDEX: 37.03 KG/M2 | DIASTOLIC BLOOD PRESSURE: 80 MMHG | HEART RATE: 77 BPM | HEIGHT: 63 IN | WEIGHT: 209 LBS | SYSTOLIC BLOOD PRESSURE: 116 MMHG

## 2021-08-06 DIAGNOSIS — Z3A.27 27 WEEKS GESTATION OF PREGNANCY: ICD-10-CM

## 2021-08-06 DIAGNOSIS — O36.5930 POOR FETAL GROWTH AFFECTING MANAGEMENT OF MOTHER IN THIRD TRIMESTER, SINGLE OR UNSPECIFIED FETUS: ICD-10-CM

## 2021-08-06 DIAGNOSIS — Z3A.30 30 WEEKS GESTATION OF PREGNANCY: Primary | ICD-10-CM

## 2021-08-06 DIAGNOSIS — F32.A DEPRESSION AFFECTING PREGNANCY: ICD-10-CM

## 2021-08-06 DIAGNOSIS — O09.899 SUPERVISION OF OTHER HIGH RISK PREGNANCIES, UNSPECIFIED TRIMESTER: ICD-10-CM

## 2021-08-06 DIAGNOSIS — O36.5920 POOR FETAL GROWTH AFFECTING MANAGEMENT OF MOTHER IN SECOND TRIMESTER, SINGLE OR UNSPECIFIED FETUS: ICD-10-CM

## 2021-08-06 DIAGNOSIS — O21.9 NAUSEA AND VOMITING DURING PREGNANCY: ICD-10-CM

## 2021-08-06 DIAGNOSIS — Z92.89 HISTORY OF BLOOD TRANSFUSION: ICD-10-CM

## 2021-08-06 DIAGNOSIS — O99.340 DEPRESSION AFFECTING PREGNANCY: ICD-10-CM

## 2021-08-06 LAB
ALBUMIN SERPL-MCNC: 3.8 G/DL (ref 3.5–5.2)
ALP BLD-CCNC: 223 U/L (ref 35–104)
ALT SERPL-CCNC: 78 U/L (ref 0–32)
ANION GAP SERPL CALCULATED.3IONS-SCNC: 15 MMOL/L (ref 7–16)
AST SERPL-CCNC: 122 U/L (ref 0–31)
BACTERIA: ABNORMAL /HPF
BASOPHILS ABSOLUTE: 0.06 E9/L (ref 0–0.2)
BASOPHILS RELATIVE PERCENT: 0.5 % (ref 0–2)
BILIRUB SERPL-MCNC: 0.4 MG/DL (ref 0–1.2)
BILIRUBIN URINE: ABNORMAL
BLOOD, URINE: NEGATIVE
BUN BLDV-MCNC: 9 MG/DL (ref 6–20)
CALCIUM SERPL-MCNC: 9.4 MG/DL (ref 8.6–10.2)
CHLORIDE BLD-SCNC: 104 MMOL/L (ref 98–107)
CLARITY: ABNORMAL
CO2: 20 MMOL/L (ref 22–29)
COLOR: YELLOW
CREAT SERPL-MCNC: 0.7 MG/DL (ref 0.5–1)
CREATININE URINE: 237 MG/DL (ref 29–226)
EOSINOPHILS ABSOLUTE: 0.18 E9/L (ref 0.05–0.5)
EOSINOPHILS RELATIVE PERCENT: 1.4 % (ref 0–6)
EPITHELIAL CELLS, UA: ABNORMAL /HPF
FERRITIN: 62 NG/ML
FOLATE: 9.3 NG/ML (ref 4.8–24.2)
GFR AFRICAN AMERICAN: >60
GFR NON-AFRICAN AMERICAN: >60 ML/MIN/1.73
GLUCOSE BLD-MCNC: 72 MG/DL (ref 74–99)
GLUCOSE URINE, POC: NEGATIVE
GLUCOSE URINE: NEGATIVE MG/DL
HCT VFR BLD CALC: 37.4 % (ref 34–48)
HEMOGLOBIN: 12.5 G/DL (ref 11.5–15.5)
IMMATURE GRANULOCYTES #: 0.09 E9/L
IMMATURE GRANULOCYTES %: 0.7 % (ref 0–5)
KETONES, URINE: NEGATIVE MG/DL
LEUKOCYTE ESTERASE, URINE: ABNORMAL
LYMPHOCYTES ABSOLUTE: 2.51 E9/L (ref 1.5–4)
LYMPHOCYTES RELATIVE PERCENT: 20 % (ref 20–42)
MAGNESIUM: 1.9 MG/DL (ref 1.6–2.6)
MCH RBC QN AUTO: 30.1 PG (ref 26–35)
MCHC RBC AUTO-ENTMCNC: 33.4 % (ref 32–34.5)
MCV RBC AUTO: 90.1 FL (ref 80–99.9)
MONOCYTES ABSOLUTE: 0.62 E9/L (ref 0.1–0.95)
MONOCYTES RELATIVE PERCENT: 4.9 % (ref 2–12)
NEUTROPHILS ABSOLUTE: 9.1 E9/L (ref 1.8–7.3)
NEUTROPHILS RELATIVE PERCENT: 72.5 % (ref 43–80)
NITRITE, URINE: NEGATIVE
PDW BLD-RTO: 13.1 FL (ref 11.5–15)
PH UA: 6.5 (ref 5–9)
PLATELET # BLD: 210 E9/L (ref 130–450)
PMV BLD AUTO: 11.1 FL (ref 7–12)
POTASSIUM SERPL-SCNC: 3.9 MMOL/L (ref 3.5–5)
PROTEIN PROTEIN: 35 MG/DL (ref 0–12)
PROTEIN UA: ABNORMAL
PROTEIN UA: ABNORMAL MG/DL
PROTEIN/CREAT RATIO: 0.1
PROTEIN/CREAT RATIO: 0.1 (ref 0–0.2)
RBC # BLD: 4.15 E12/L (ref 3.5–5.5)
RBC UA: ABNORMAL /HPF (ref 0–2)
SODIUM BLD-SCNC: 139 MMOL/L (ref 132–146)
SPECIFIC GRAVITY UA: 1.01 (ref 1–1.03)
T3 FREE: 2.7 PG/ML (ref 2–4.4)
T4 FREE: 0.87 NG/DL (ref 0.93–1.7)
TOTAL PROTEIN: 7.3 G/DL (ref 6.4–8.3)
TSH SERPL DL<=0.05 MIU/L-ACNC: 2.09 UIU/ML (ref 0.27–4.2)
URIC ACID, SERUM: 6.9 MG/DL (ref 2.4–5.7)
UROBILINOGEN, URINE: 0.2 E.U./DL
VITAMIN B-12: 831 PG/ML (ref 211–946)
VITAMIN D 25-HYDROXY: 38 NG/ML (ref 30–100)
WBC # BLD: 12.6 E9/L (ref 4.5–11.5)
WBC UA: ABNORMAL /HPF (ref 0–5)

## 2021-08-06 PROCEDURE — 81002 URINALYSIS NONAUTO W/O SCOPE: CPT | Performed by: OBSTETRICS & GYNECOLOGY

## 2021-08-06 PROCEDURE — 36415 COLL VENOUS BLD VENIPUNCTURE: CPT

## 2021-08-06 PROCEDURE — 84481 FREE ASSAY (FT-3): CPT

## 2021-08-06 PROCEDURE — 82570 ASSAY OF URINE CREATININE: CPT

## 2021-08-06 PROCEDURE — 82239 BILE ACIDS TOTAL: CPT

## 2021-08-06 PROCEDURE — 82746 ASSAY OF FOLIC ACID SERUM: CPT

## 2021-08-06 PROCEDURE — 82607 VITAMIN B-12: CPT

## 2021-08-06 PROCEDURE — 84156 ASSAY OF PROTEIN URINE: CPT

## 2021-08-06 PROCEDURE — 82728 ASSAY OF FERRITIN: CPT

## 2021-08-06 PROCEDURE — 4004F PT TOBACCO SCREEN RCVD TLK: CPT | Performed by: OBSTETRICS & GYNECOLOGY

## 2021-08-06 PROCEDURE — 87088 URINE BACTERIA CULTURE: CPT

## 2021-08-06 PROCEDURE — 76819 FETAL BIOPHYS PROFIL W/O NST: CPT | Performed by: OBSTETRICS & GYNECOLOGY

## 2021-08-06 PROCEDURE — G8419 CALC BMI OUT NRM PARAM NOF/U: HCPCS | Performed by: OBSTETRICS & GYNECOLOGY

## 2021-08-06 PROCEDURE — 85025 COMPLETE CBC W/AUTO DIFF WBC: CPT

## 2021-08-06 PROCEDURE — 86800 THYROGLOBULIN ANTIBODY: CPT

## 2021-08-06 PROCEDURE — 81001 URINALYSIS AUTO W/SCOPE: CPT

## 2021-08-06 PROCEDURE — 82306 VITAMIN D 25 HYDROXY: CPT

## 2021-08-06 PROCEDURE — 86376 MICROSOMAL ANTIBODY EACH: CPT

## 2021-08-06 PROCEDURE — 83735 ASSAY OF MAGNESIUM: CPT

## 2021-08-06 PROCEDURE — 84432 ASSAY OF THYROGLOBULIN: CPT

## 2021-08-06 PROCEDURE — 76820 UMBILICAL ARTERY ECHO: CPT | Performed by: OBSTETRICS & GYNECOLOGY

## 2021-08-06 PROCEDURE — G8427 DOCREV CUR MEDS BY ELIG CLIN: HCPCS | Performed by: OBSTETRICS & GYNECOLOGY

## 2021-08-06 PROCEDURE — 86803 HEPATITIS C AB TEST: CPT

## 2021-08-06 PROCEDURE — 84443 ASSAY THYROID STIM HORMONE: CPT

## 2021-08-06 PROCEDURE — 99213 OFFICE O/P EST LOW 20 MIN: CPT | Performed by: OBSTETRICS & GYNECOLOGY

## 2021-08-06 PROCEDURE — 80053 COMPREHEN METABOLIC PANEL: CPT

## 2021-08-06 PROCEDURE — 76816 OB US FOLLOW-UP PER FETUS: CPT | Performed by: OBSTETRICS & GYNECOLOGY

## 2021-08-06 PROCEDURE — 84550 ASSAY OF BLOOD/URIC ACID: CPT

## 2021-08-06 PROCEDURE — 76821 MIDDLE CEREBRAL ARTERY ECHO: CPT | Performed by: OBSTETRICS & GYNECOLOGY

## 2021-08-06 PROCEDURE — 84439 ASSAY OF FREE THYROXINE: CPT

## 2021-08-06 PROCEDURE — 99212 OFFICE O/P EST SF 10 MIN: CPT | Performed by: OBSTETRICS & GYNECOLOGY

## 2021-08-06 RX ORDER — HYDROXYZINE PAMOATE 25 MG/1
25 CAPSULE ORAL 3 TIMES DAILY PRN
Qty: 45 CAPSULE | Refills: 2 | Status: SHIPPED | OUTPATIENT
Start: 2021-08-06 | End: 2021-09-20

## 2021-08-06 NOTE — LETTER
[facility] Maternal Fetal Medicine  [address] [place] [address]  [address] [address]  Phone: [phone]  Fax: [phone]           Sharona Nolasco MD      August 11, 2021     Patient: Nancy Boucher   MR Number: [id number]   YOB: 1995   Date of Visit: 8/6/2021       Dear Dr. Denise Senior:    Thank you for referring Nancy Boucher to me for evaluation/treatment. Below are the relevant portions of my assessment and plan of care. If you have questions, please do not hesitate to call me. I look forward to following Kaycee Martinez along with you.     Sincerely,    MD Sharona Cooley MD    CC providers:  eFlicita Marr MD  [address] [facility]  [phone]  [facility] [address]  [address]

## 2021-08-06 NOTE — PROGRESS NOTES
Patient here for prenatal ultrasound and NST  Denies any bleeding, cramping or LOF but does c/o pressure in lower pelvis and back pain  +FM noted

## 2021-08-06 NOTE — PATIENT INSTRUCTIONS
if you are sick, not feeling well or have an infectious process going on please reschedule your appointment by calling [phone]. Also if any family members are not feeling well, please do not bring them to your appointment. We appreciate your cooperation. We are doing this in order to protect our pregnant mothers+ their babies. Call your primary obstetrician with bleeding, leaking of fluid, abdominal tenderness, headache, blurry vision, epigastric pain and increased urinary frequency. Any questions contact Samanta at [phone]. If you are experiencing an emergency and need immediate help, call 911 or go to go emergency room or labor and delivery. Please arrive for your scheduled appointment at least 15 minutes early with your actual insurance card+ a photo ID. Also if you need any refills ordered or have questions, it may take up 48 hours to reply. Please allow ample time for your refills. Call me when you use last refill. Thank you for your cooperation. You might be having an NST at your next appt. Please eat a large snack or breakfast before coming to office. Thank youDo kick counts after dinner. Call your primary obstetrician if less than 10 kicks in 2 hours after dinner. Call your primary obstetrician with bleeding, leaking of fluid, abdominal tenderness, headache, blurry vision, epigastric pain and increased urinary frequency. Patient Education        Weeks 30 to 28 of Your Pregnancy: Care Instructions  Overview     You've made it to the final months of your pregnancy! By now your baby is really starting to look like a baby, with hair and plump skin. As you enter the final weeks of pregnancy, the reality of having a baby may start to set in. This is a good time to set up a safe nursery and find quality  if needed. Doing this stuff ahead of time will allow you to focus on caring for and enjoying your new baby. You may also want to take a tour of your hospital's labor and delivery unit.  This will help you get a better idea of what to expect while you're in the hospital.  During these last months, be sure to take good care of yourself. Pay attention to what your body needs. If your doctor says it's okay for you to work, don't push yourself too hard. If you haven't already had the Tdap shot during this pregnancy, talk to your doctor about getting it. It will help protect your  against pertussis infection. Follow-up care is a key part of your treatment and safety. Be sure to make and go to all appointments, and call your doctor if you are having problems. It's also a good idea to know your test results and keep a list of the medicines you take. How can you care for yourself at home? Pay attention to your baby's movements  · You should feel your baby move several times every day. · Your baby now turns less, and kicks and jabs more. · Your baby sleeps 20 to 45 minutes at a time and is more active at certain times of day. · If your doctor wants you to count your baby's kicks:  ? Empty your bladder, and lie on your side or relax in a comfortable chair. ? Write down your start time. ? Pay attention only to your baby's movements. Count any movement except hiccups. ? After you have counted 10 movements, write down your stop time. ? Write down how many minutes it took for your baby to move 10 times. ? If an hour goes by and you have not recorded 10 movements, have something to eat or drink and then count for another hour. If you do not record 10 movements in either hour, call your doctor. Ease heartburn  · Eat small, frequent meals. · Do not eat chocolate, peppermint, or very spicy foods. Avoid drinks with caffeine, such as coffee, tea, and sodas. · Avoid bending over or lying down after meals. · Talk a short walk after you eat. · If heartburn is a problem at night, do not eat for 2 hours before bedtime. · Take antacids like Mylanta, Maalox, Rolaids, or Tums.  Do not take antacids that have sodium bicarbonate. Care for varicose veins  · Varicose veins are blood vessels that stretch out with the extra blood during pregnancy. Your legs may ache or throb. Most varicose veins will go away after the birth. · Avoid standing for long periods of time. Sit with your legs crossed at the ankles, not the knees. · Sit with your feet propped up. · Avoid tight clothing or stockings. Wear support hose. · Exercise regularly. Try walking for at least 30 minutes a day. Where can you learn more? Go to https://AlertspeElance.Bluestreak Technology. org and sign in to your Sports MatchMaker account. Enter F223 in the Frontleaf box to learn more about \"Weeks 30 to 32 of Your Pregnancy: Care Instructions. \"     If you do not have an account, please click on the \"Sign Up Now\" link. Current as of: October 8, 2020               Content Version: 12.9  © 2006-2021 [employer]an [employer]. Care instructions adapted under license by [facility] ([facility]). If you have questions about a medical condition or this instruction, always ask your healthcare professional. [place] [address] any warranty or liability for your use of this information. Patient Education        Learning About When to Call Your Doctor During Pregnancy (After 20 Weeks)  Your Care Instructions  It's common to have concerns about what might be a problem during pregnancy. Although most pregnant women don't have any serious problems, it's important to know when to call your doctor if you have certain symptoms or signs of labor. These are general suggestions. Your doctor may give you some more information about when to call. When to call your doctor (after 20 weeks)  Call 911  anytime you think you may need emergency care. For example, call if:  · You have severe vaginal bleeding. · You have sudden, severe pain in your belly. · You passed out (lost consciousness). · You have a seizure. · You see or feel the umbilical cord.   · You think you are about to deliver your baby and can't make it safely to the hospital.  Call your doctor now or seek immediate medical care if:  · You have vaginal bleeding. · You have belly pain. · You have a fever. · You have symptoms of preeclampsia, such as:  ? Sudden swelling of your face, hands, or feet. ? New vision problems (such as dimness, blurring, or seeing spots). ? A severe headache. · You have a sudden release of fluid from your vagina. (You think your water broke.)  · You think that you may be in labor. This means that you've had at least 6 contractions in an hour. · You notice that your baby has stopped moving or is moving much less than normal.  · You have symptoms of a urinary tract infection. These may include:  ? Pain or burning when you urinate. ? A frequent need to urinate without being able to pass much urine. ? Pain in the flank, which is just below the rib cage and above the waist on either side of the back. ? Blood in your urine. Watch closely for changes in your health, and be sure to contact your doctor if:  · You have vaginal discharge that smells bad. · You have skin changes, such as:  ? A rash. ? Itching. ? Yellow color to your skin. · You have other concerns about your pregnancy. If you have labor signs at 37 weeks or more  If you have signs of labor at 37 weeks or more, your doctor may tell you to call when your labor becomes more active. Symptoms of active labor include:  · Contractions that are regular. · Contractions that are less than 5 minutes apart. · Contractions that are hard to talk through. Follow-up care is a key part of your treatment and safety. Be sure to make and go to all appointments, and call your doctor if you are having problems. It's also a good idea to know your test results and keep a list of the medicines you take. Where can you learn more? Go to https://chmorgan.LiveOffice. org and sign in to your PaperShare account.  Enter  in the [address] Information box to learn more about \"Learning About When to Call Your Doctor During Pregnancy (After 20 Weeks). \"     If you do not have an account, please click on the \"Sign Up Now\" link. Current as of: October 8, 2020               Content Version: 12.9  © 3653-5673 Healthwise, Language Systems. Care instructions adapted under license by [facility] ([facility]). If you have questions about a medical condition or this instruction, always ask your healthcare professional. [place] [address] any warranty or liability for your use of this information. Patient Education        Counting Your Baby's Kicks: Care Instructions  Your Care Instructions     Counting your baby's kicks is one way your doctor can tell that your baby is healthy. Most women--especially in a first pregnancy--feel their baby move for the first time between 16 and 22 weeks. The movement may feel like flutters rather than kicks. Your baby may move more at certain times of the day. When you are active, you may notice less kicking than when you are resting. At your prenatal visits, your doctor will ask whether the baby is active. In your last trimester, your doctor may ask you to count the number of times you feel your baby move. Follow-up care is a key part of your treatment and safety. Be sure to make and go to all appointments, and call your doctor if you are having problems. It's also a good idea to know your test results and keep a list of the medicines you take. How do you count fetal kicks? · A common method of checking your baby's movement is to count the number of kicks or moves you feel in 1 hour. Ten movements (such as kicks, flutters, or rolls) in 1 hour are normal. Some doctors suggest that you count in the morning until you get to 10 movements. Then you can quit for that day and start again the next day. · Pick your baby's most active time of day to count. This may be any time from morning to evening.   · If you do not feel 10 movements in an hour, your baby may be sleeping. Wait for the next hour and count again. When should you call for help? Call your doctor now or seek immediate medical care if:    · You noticed that your baby has stopped moving or is moving much less than normal.   Watch closely for changes in your health, and be sure to contact your doctor if you have any problems. Where can you learn more? Go to https://Butter SystemspeCreation Technologies.Yabbly. org and sign in to your [employer] account. Enter P554 in the WiDaPeople box to learn more about \"Counting Your Baby's Kicks: Care Instructions. \"     If you do not have an account, please click on the \"Sign Up Now\" link. Current as of: October 8, 2020               Content Version: 12.9  © 2006-2021 Healthwise, Incorporated. Care instructions adapted under license by [facility] ([facility]). If you have questions about a medical condition or this instruction, always ask your healthcare professional. [place] [address] any warranty or liability for your use of this information.

## 2021-08-08 LAB — URINE CULTURE, ROUTINE: NORMAL

## 2021-08-09 ENCOUNTER — TELEPHONE (OUTPATIENT)
Dept: OBGYN CLINIC | Age: 26
End: 2021-08-09

## 2021-08-09 DIAGNOSIS — O09.899 SUPERVISION OF OTHER HIGH RISK PREGNANCIES, UNSPECIFIED TRIMESTER: Primary | ICD-10-CM

## 2021-08-09 LAB
BILE ACIDS TOTAL: 2 UMOL/L (ref 0–10)
HEPATITIS C ANTIBODY INTERPRETATION: NORMAL
THYROGLOBULIN AB: <0.9 IU/ML (ref 0–4)
THYROGLOBULIN BY LC-MS/MS, SERUM/PLASMA: NORMAL NG/ML (ref 1.3–31.8)
THYROGLOBULIN, SERUM OR PLASMA: 5.5 NG/ML (ref 1.3–31.8)

## 2021-08-09 RX ORDER — FOLIC ACID 1 MG/1
1 TABLET ORAL DAILY
Qty: 30 TABLET | Refills: 3 | Status: ON HOLD
Start: 2021-08-09 | End: 2021-10-18 | Stop reason: HOSPADM

## 2021-08-09 NOTE — TELEPHONE ENCOUNTER
Patient called regarding Dr Nata Grey order to repeat labs and start medication as ordered. Pt verbalized understanding.   Requests meds be sent to [id number] [facility]

## 2021-08-11 LAB — THYROID PEROXIDASE (TPO) ABS: <4 IU/ML (ref 0–25)

## 2021-08-11 NOTE — PROGRESS NOTES
[address] [place] [address] [facility], [place]. [address]  Ph: [phone] Fax: [phone]    RE:  Juana Santoro  : 1995   AGE: 32 y.o. 2021    Dear Dr: Radha Duron:   Cc: Dr Jenny Mejia seen [facility] done . · Abn US finding o35.8xx0  · Fetal Problem o36.9  · High risk pregnancy o09.89  · Oligohydramnios o41.0  · Poor growth o36.59  · Poor OB History o09.29  · Hx induction 38w oligohydramios   · Smoking o99.33  ? Marijuana, tobacco   · ~Note - DSUA neg proteinuria, neg glucosuria today     Labs -  Uric Acid 6.9 ^^  ALT 78 ^^  ^^^  Total Bile Acids 2 ( WNL)   FT4- o.87 (L)  MG++,VitD,Vit B12, Ferretin WNL  TSH/ WNL  HepC Ab WNL     Impression  - Elevated transaminases, UA - Normal BP  Dehydrated - sm amt  bile in urine     27yo    at   30w 5 d  EGA  VSS Afebrile   /80+FM, Occasional U/C's  - No Active Labor  No Bleeding - No PPROM    US     ? Vertex  Female   fetus @  34w 5d   ? Active, responsive baby. The amniotic fluid volume appears low- ALEX 7.8.   ? S=D    ? 1381g ( 3:1) 28%ile     No Distress by US/BPP/Doppler     Impression - Stable    Recommendation -   1. Followup visit in 2  weeks . 2. Watch for liver problems - watch for low glucose/ ^ ammonia  ( AFLP risk)   3. Look at [facility] next visit.    4. MFM Appointment has  been   scheduled 21         Susan Salter MD

## 2021-08-17 ENCOUNTER — TELEPHONE (OUTPATIENT)
Dept: OBGYN CLINIC | Age: 26
End: 2021-08-17

## 2021-08-29 ENCOUNTER — HOSPITAL ENCOUNTER (EMERGENCY)
Dept: HOSPITAL 83 - ED | Age: 26
Discharge: LEFT BEFORE DISCHARGE | End: 2021-08-29
Payer: COMMERCIAL

## 2021-08-29 ENCOUNTER — HOSPITAL ENCOUNTER (OUTPATIENT)
Age: 26
Discharge: HOME | End: 2021-08-29
Attending: OBSTETRICS & GYNECOLOGY | Admitting: OBSTETRICS & GYNECOLOGY
Payer: MEDICAID

## 2021-08-29 VITALS
HEIGHT: 63 IN | WEIGHT: 215 LBS | BODY MASS INDEX: 38.09 KG/M2 | RESPIRATION RATE: 14 BRPM | TEMPERATURE: 98.4 F | DIASTOLIC BLOOD PRESSURE: 68 MMHG | SYSTOLIC BLOOD PRESSURE: 126 MMHG | HEART RATE: 97 BPM

## 2021-08-29 DIAGNOSIS — Z53.21: ICD-10-CM

## 2021-08-29 DIAGNOSIS — Z3A.00: ICD-10-CM

## 2021-08-29 DIAGNOSIS — O26.90: Primary | ICD-10-CM

## 2021-08-29 PROBLEM — Z3A.34 34 WEEKS GESTATION OF PREGNANCY: Status: ACTIVE | Noted: 2021-08-29

## 2021-08-29 LAB
ALBUMIN SERPL-MCNC: 3.5 G/DL (ref 3.5–5.2)
ALP BLD-CCNC: 165 U/L (ref 35–104)
ALT SERPL-CCNC: 11 U/L (ref 0–32)
AMNISURE, POC: NEGATIVE
ANION GAP SERPL CALCULATED.3IONS-SCNC: 11 MMOL/L (ref 7–16)
AST SERPL-CCNC: 18 U/L (ref 0–31)
BACTERIA: ABNORMAL /HPF
BILIRUB SERPL-MCNC: 0.2 MG/DL (ref 0–1.2)
BILIRUBIN URINE: NEGATIVE
BLOOD, URINE: NEGATIVE
BUN BLDV-MCNC: 8 MG/DL (ref 6–20)
CALCIUM SERPL-MCNC: 8.9 MG/DL (ref 8.6–10.2)
CHLORIDE BLD-SCNC: 103 MMOL/L (ref 98–107)
CLARITY: NORMAL
CO2: 22 MMOL/L (ref 22–29)
COLOR: YELLOW
CREAT SERPL-MCNC: 0.7 MG/DL (ref 0.5–1)
EPITHELIAL CELLS, UA: ABNORMAL /HPF
GFR AFRICAN AMERICAN: >60
GFR NON-AFRICAN AMERICAN: >60 ML/MIN/1.73
GLUCOSE BLD-MCNC: 108 MG/DL (ref 74–99)
GLUCOSE URINE: NEGATIVE MG/DL
KETONES, URINE: NEGATIVE MG/DL
LEUKOCYTE ESTERASE, URINE: NEGATIVE
Lab: NORMAL
MUCUS: PRESENT /LPF
NEGATIVE QC PASS/FAIL: NORMAL
NITRITE, URINE: NEGATIVE
PH UA: 7.5 (ref 5–9)
POSITIVE QC PASS/FAIL: NORMAL
POTASSIUM SERPL-SCNC: 3.9 MMOL/L (ref 3.5–5)
PROTEIN UA: NEGATIVE MG/DL
RBC UA: ABNORMAL /HPF (ref 0–2)
SODIUM BLD-SCNC: 136 MMOL/L (ref 132–146)
SPECIFIC GRAVITY UA: 1.01 (ref 1–1.03)
TOTAL PROTEIN: 6.8 G/DL (ref 6.4–8.3)
UROBILINOGEN, URINE: 1 E.U./DL
WBC UA: ABNORMAL /HPF (ref 0–5)

## 2021-08-29 PROCEDURE — 81001 URINALYSIS AUTO W/SCOPE: CPT

## 2021-08-29 PROCEDURE — 84112 EVAL AMNIOTIC FLUID PROTEIN: CPT

## 2021-08-29 PROCEDURE — 36415 COLL VENOUS BLD VENIPUNCTURE: CPT

## 2021-08-29 PROCEDURE — 80053 COMPREHEN METABOLIC PANEL: CPT

## 2021-08-29 PROCEDURE — 99215 OFFICE O/P EST HI 40 MIN: CPT | Performed by: OBSTETRICS & GYNECOLOGY

## 2021-08-29 PROCEDURE — 99211 OFF/OP EST MAY X REQ PHY/QHP: CPT

## 2021-08-29 NOTE — H&P
CHIEF COMPLAINT:   spontaneous premature rupture of the membranesHad a gush of fluid twice while trying to urinate. No c/o contractions      HISTORY OF PRESENT ILLNESS:    The patient is a 32 y.o. female , Patient's last menstrual period was 2021.,  at 34w0d. OB History        2    Para   1    Term   1       0    AB        Living   1       SAB   0    TAB   0    Ectopic   0    Molar   0    Multiple   0    Live Births   1            Patient presents with a chief complaint as above and is being admitted for evaluation    Estimated Due Date: Estimated Date of Delivery: 10/10/21    PRENATAL CARE:  Complicated by:   Patient Active Problem List   Diagnosis Code    History of blood transfusion Z92.89    Nausea and vomiting during pregnancy O21.9    Poor fetal growth affecting management of mother in second trimester O39.0    History of oligohydramnios in prior pregnancy, currently pregnant in second trimester O200.18    Family history of ovarian cancer Z80.41    Marijuana use F12.90    Depression affecting pregnancy O99.340, F32.9       PAST OB HISTORY  OB History        2    Para   1    Term   1       0    AB        Living   1       SAB   0    TAB   0    Ectopic   0    Molar   0    Multiple   0    Live Births   1                Past Medical History:        Diagnosis Date    Depression     History of blood transfusion     after first labor       Past Surgical History:    History reviewed. No pertinent surgical history. Social History:    TOBACCO:   reports that she has been smoking cigarettes. She has been smoking about 0.50 packs per day. She has never used smokeless tobacco.  ETOH:   reports previous alcohol use. DRUGS:   reports current drug use. Drug: Marijuana.     Family History:       Problem Relation Age of Onset    Cancer Mother         ovarian       Medications Prior to Admission:  Medications Prior to Admission: folic acid (FOLVITE) 1 MG tablet, Take 1 tablet by mouth daily  hydrOXYzine (VISTARIL) 25 MG capsule, Take 1 capsule by mouth 3 times daily as needed for Itching or Anxiety  vitamin B-6 (PYRIDOXINE) 50 MG tablet, Take 25 mg by mouth daily  Doxylamine Succinate, Sleep, (UNISOM PO), Take by mouth  Prenatal MV-Min-Fe Fum-FA-DHA (PRENATAL 1 PO), Take by mouth  sertraline (ZOLOFT) 20 MG/ML concentrated solution, Take 10 mg by mouth daily    Allergies:  Patient has no known allergies. Review of Systems:   Constitutional : No fever, no chills   HEENT: No headache, no visual changes, no rhinorrhea, no sore throat   Cardiovascular : No pain, no palpitations, no edema   Respiratory : No pain, no shortness of breath   Gastrointestinal : No N/V, no D/C, no abdominal pain   Genitourinary : No dysuria, hematuria and no incontinence   Musculoskeletal : No myalgia, No back pain  Neurological : No numbness, no tingling, no tremors. No history of seizures  All other systems were reported as negative. PHYSICAL EXAM:    General appearance:  awake, alert, cooperative, no apparent distress, and appears stated age  Neurologic:  Awake, alert, oriented to name, place and time. Lungs:  No increased work of breathing, good air exchange, clear to auscultation bilaterally, no crackles or wheezing  Heart:  Normal apical impulse, regular rate and rhythm, normal S1 and S2, no S3 or S4, and no murmur noted  Abdomen:  Her uterus is gravid. She had no complaint of abdominal pain or tenderness. The fetus is in the cephalic presentation. No contractions palpated. Fetal heart rate:  Fetal heart tracing is reassuring with a normal baseline, variability, and accelerations. No decelerations detected. Pelvis:  External Genitalia: General appearance; normal, Hair distribution; normal, Lesions absent  Cervix: Closed, thick. Head ballotable. Extremities: Mild peripheral edema is noted.      Temp 98.4 °F (36.9 °C)   Resp 14   Ht 5' 3\" (1.6 m)   Wt 215 lb (97.5 kg)   LMP 01/03/2021   BMI 38.09 kg/m²     General Labs:    CBC:   Lab Results   Component Value Date    WBC 12.6 08/06/2021    RBC 4.15 08/06/2021    HGB 12.5 08/06/2021    HCT 37.4 08/06/2021    MCV 90.1 08/06/2021    RDW 13.1 08/06/2021     08/06/2021     CMP:    Lab Results   Component Value Date     08/06/2021    K 3.9 08/06/2021     08/06/2021    CO2 20 08/06/2021    BUN 9 08/06/2021    PROT 7.3 08/06/2021     U/A:  No components found for: Niki Mixer, USPGRAV, UPH, UPROTEIN, UGLUCOSE, UKETONE, UBILI, UBLOOD, UNITRITE, UUROBIL, ULEUKEST, USQEPI, URENEPI, UWBC, Chucky, Synchari, UHYALINE    ASSESSMENT AND PLAN:  No evidence of SRM. Amnisure test negative. Likely incontinence of urine, need to r/o UTI  IUGR, oligohydramnios ?  resolved  No contractions on tocography  Fetal tracing reassuring  Midstream urine sent for UA and microscopy  CMP, CBC

## 2021-08-29 NOTE — PROGRESS NOTES
Reviewed antepartum D/C instructions with pt. Pt instructed to return to the hospital for contractions, leaking of fluid, bleeding or decreased fetal movement for any other complaints pt should call Dr for further instruction. Pt verbalized understanding of all information given and denies any further questions at this time. Pt ambulated off unit.

## 2021-08-29 NOTE — PROGRESS NOTES
Pt presents to L&D for complaints of rule out rupture today, pt states she is also peeing more frequently and is complaining of cramping in her lower abdomen and pressure. Pt states good fetal movement, denies LOF or vaginal bleeding or contractions. Pt placed on EFM fetal heart tones present and abdomen soft and gravid.  Pt given call light and instructed on use

## 2021-08-29 NOTE — PROGRESS NOTES
Notified Dr. Yesenia Trimble on lab results for urine and cmp. Also notified pt amnisure -. Per Dr. Yesenia Trimble pt may be discharged home at time.

## 2021-09-02 ENCOUNTER — HOSPITAL ENCOUNTER (OUTPATIENT)
Dept: INFUSION THERAPY | Age: 26
Setting detail: INFUSION SERIES
End: 2021-09-02
Payer: MEDICAID

## 2021-09-02 ENCOUNTER — HOSPITAL ENCOUNTER (OUTPATIENT)
Age: 26
Discharge: HOME | End: 2021-09-02
Payer: MEDICAID

## 2021-09-02 LAB
ABO/RH: NORMAL
ANTIBODY SCREEN: NORMAL

## 2021-09-02 PROCEDURE — 86900 BLOOD TYPING SEROLOGIC ABO: CPT

## 2021-09-02 PROCEDURE — 86901 BLOOD TYPING SEROLOGIC RH(D): CPT

## 2021-09-02 PROCEDURE — 36415 COLL VENOUS BLD VENIPUNCTURE: CPT

## 2021-09-02 PROCEDURE — 86850 RBC ANTIBODY SCREEN: CPT

## 2021-09-09 ENCOUNTER — ANCILLARY PROCEDURE (OUTPATIENT)
Dept: OBGYN CLINIC | Age: 26
End: 2021-09-09
Payer: MEDICAID

## 2021-09-09 ENCOUNTER — ROUTINE PRENATAL (OUTPATIENT)
Dept: OBGYN CLINIC | Age: 26
End: 2021-09-09
Payer: MEDICAID

## 2021-09-09 ENCOUNTER — HOSPITAL ENCOUNTER (OUTPATIENT)
Dept: INFUSION THERAPY | Age: 26
Setting detail: INFUSION SERIES
Discharge: HOME | End: 2021-09-09
Payer: MEDICAID

## 2021-09-09 VITALS
OXYGEN SATURATION: 98 % | TEMPERATURE: 97.7 F | HEIGHT: 63 IN | HEART RATE: 89 BPM | DIASTOLIC BLOOD PRESSURE: 69 MMHG | BODY MASS INDEX: 38.09 KG/M2 | SYSTOLIC BLOOD PRESSURE: 130 MMHG | WEIGHT: 215 LBS | RESPIRATION RATE: 16 BRPM

## 2021-09-09 VITALS
HEIGHT: 63 IN | HEART RATE: 86 BPM | WEIGHT: 215 LBS | DIASTOLIC BLOOD PRESSURE: 69 MMHG | SYSTOLIC BLOOD PRESSURE: 105 MMHG | BODY MASS INDEX: 38.09 KG/M2

## 2021-09-09 DIAGNOSIS — Z3A.35 35 WEEKS GESTATION OF PREGNANCY: Primary | ICD-10-CM

## 2021-09-09 LAB
GLUCOSE URINE, POC: NORMAL
PROTEIN UA: POSITIVE
RHIG LOT NUMBER: NORMAL

## 2021-09-09 PROCEDURE — 76821 MIDDLE CEREBRAL ARTERY ECHO: CPT | Performed by: OBSTETRICS & GYNECOLOGY

## 2021-09-09 PROCEDURE — 99213 OFFICE O/P EST LOW 20 MIN: CPT | Performed by: OBSTETRICS & GYNECOLOGY

## 2021-09-09 PROCEDURE — 96372 THER/PROPH/DIAG INJ SC/IM: CPT

## 2021-09-09 PROCEDURE — 6360000002 HC RX W HCPCS: Performed by: NURSE PRACTITIONER

## 2021-09-09 PROCEDURE — 76816 OB US FOLLOW-UP PER FETUS: CPT | Performed by: OBSTETRICS & GYNECOLOGY

## 2021-09-09 PROCEDURE — 99999 PR OFFICE/OUTPT VISIT,PROCEDURE ONLY: CPT | Performed by: OBSTETRICS & GYNECOLOGY

## 2021-09-09 PROCEDURE — 81002 URINALYSIS NONAUTO W/O SCOPE: CPT | Performed by: OBSTETRICS & GYNECOLOGY

## 2021-09-09 PROCEDURE — 76818 FETAL BIOPHYS PROFILE W/NST: CPT | Performed by: OBSTETRICS & GYNECOLOGY

## 2021-09-09 PROCEDURE — 76820 UMBILICAL ARTERY ECHO: CPT | Performed by: OBSTETRICS & GYNECOLOGY

## 2021-09-09 RX ORDER — FAMOTIDINE 20 MG/1
20 TABLET, FILM COATED ORAL 2 TIMES DAILY
Status: ON HOLD | COMMUNITY
End: 2021-10-18 | Stop reason: HOSPADM

## 2021-09-09 RX ADMIN — HUMAN RHO(D) IMMUNE GLOBULIN 300 MCG: 300 INJECTION, SOLUTION INTRAMUSCULAR at 10:17

## 2021-09-09 ASSESSMENT — PAIN SCALES - GENERAL: PAINLEVEL_OUTOF10: 0

## 2021-09-09 NOTE — PATIENT INSTRUCTIONS
if you are sick, not feeling well or have an infectious process going on please reschedule your appointment by calling [phone]. Also if any family members are not feeling well, please do not bring them to your appointment. We appreciate your cooperation. We are doing this in order to protect our pregnant mothers+ their babies. Call your primary obstetrician with bleeding, leaking of fluid, abdominal tenderness, headache, blurry vision, epigastric pain and increased urinary frequency. Any questions contact Samanta at [phone]. If you are experiencing an emergency and need immediate help, call 911 or go to go emergency room or labor and delivery. Please arrive for your scheduled appointment at least 15 minutes early with your actual insurance card+ a photo ID. Also if you need any refills ordered or have questions, it may take up 48 hours to reply. Please allow ample time for your refills. Call me when you use last refill. Thank you for your cooperation. You might be having an NST at your next appt. Please eat a large snack or breakfast before coming to office. Thank youDo kick counts after dinner. Call your primary obstetrician if less than 10 kicks in 2 hours after dinner. Call your primary obstetrician with bleeding, leaking of fluid, abdominal tenderness, headache, blurry vision, epigastric pain and increased urinary frequency. Patient Education        Weeks 34 to 39 of Your Pregnancy: Care Instructions  Overview     By now, your baby and your belly have grown quite large. It's almost time to give birth! Your baby's lungs are almost ready to breathe air. The skull bones are firm enough to protect your baby's head, but soft enough to move down through the birth canal.  You may be feeling excited and happy at times--but also anxious or scared. You might wonder how you'll know if you're in labor or what to expect during labor. Try to be open and flexible in your expectations of the birth.  Because each birth is different, there's no way to know exactly what childbirth will be like for you. Talk to your doctor or midwife about any concerns you have. If you haven't already had the Tdap shot during this pregnancy, talk to your doctor about getting it. It will help protect your  against pertussis infection. In the 36th week, most women have a test for group B streptococcus (GBS). GBS is a common bacteria that can live in the vagina and rectum. It can make your baby sick after birth. If you test positive, you will get antibiotics during labor. The medicine will help keep your baby from getting the bacteria. Follow-up care is a key part of your treatment and safety. Be sure to make and go to all appointments, and call your doctor if you are having problems. It's also a good idea to know your test results and keep a list of the medicines you take. How can you care for yourself at home? Learn about pain relief choices  · Pain is different for every woman. Talk with your doctor about your feelings about pain. · You can choose from several types of pain relief. These include medicine or breathing techniques, as well as comfort measures. You can use more than one option. · If you choose to have pain medicine during labor, talk to your doctor about your options. Some medicines lower anxiety and help with some of the pain. Others make your lower body numb so that you won't feel pain. · Be sure to tell your doctor about your pain medicine choice before you start labor or very early in your labor. You may be able to change your mind as labor progresses. · Rarely, a woman is put to sleep by medicine given through a mask or an IV. Labor and delivery  · The first stage of labor has three parts: early, active, and transition. ? Most women have early labor at home. You can stay busy or rest, eat light snacks, drink clear fluids, and start counting contractions. ?  When talking during a contraction gets hard, you may be moving to active labor. During active labor, you should head for the hospital if you are not there already. ? You are in active labor when contractions come every 3 to 4 minutes and last about 60 seconds. Your cervix is opening more rapidly. ? If your water breaks, contractions will come faster and stronger. ? During transition, your cervix is stretching, and contractions are coming more rapidly. ? You may want to push, but your cervix might not be ready. Your doctor will tell you when to push. · The second stage starts when your cervix is completely opened and you are ready to push. ? Contractions are very strong to push the baby down the birth canal.  ? You will feel the urge to push. You may feel like you need to have a bowel movement. ? You may be coached to push with contractions. These contractions will be very strong, but you will not have them as often. You can get a little rest between contractions. ? You may be emotional and irritable. You may not be aware of what is going on around you.  ? One last push, and your baby is born. · The third stage is when a few more contractions push out the placenta. This may take 30 minutes or less. · The fourth stage is the welcome recovery. You may feel overwhelmed with emotions and exhausted but alert. This is a good time to start breastfeeding. Where can you learn more? Go to https://SIL4 Systemsmorgan.Adly. org and sign in to your Coresonic account. Enter B936 in the [facility] box to learn more about \"Weeks 34 to 36 of Your Pregnancy: Care Instructions. \"     If you do not have an account, please click on the \"Sign Up Now\" link. Current as of: October 8, 2020               Content Version: 12.9  © 1236-7922 wireWAX. Care instructions adapted under license by [address]  If you have questions about a medical condition or this instruction, always ask your healthcare professional. [address] any warranty or liability for your use of this information. Patient Education        Learning About When to Call Your Doctor During Pregnancy (After 20 Weeks)  Your Care Instructions  It's common to have concerns about what might be a problem during pregnancy. Although most pregnant women don't have any serious problems, it's important to know when to call your doctor if you have certain symptoms or signs of labor. These are general suggestions. Your doctor may give you some more information about when to call. When to call your doctor (after 20 weeks)  Call 911  anytime you think you may need emergency care. For example, call if:  · You have severe vaginal bleeding. · You have sudden, severe pain in your belly. · You passed out (lost consciousness). · You have a seizure. · You see or feel the umbilical cord. · You think you are about to deliver your baby and can't make it safely to the hospital.  Call your doctor now or seek immediate medical care if:  · You have vaginal bleeding. · You have belly pain. · You have a fever. · You have symptoms of preeclampsia, such as:  ? Sudden swelling of your face, hands, or feet. ? New vision problems (such as dimness, blurring, or seeing spots). ? A severe headache. · You have a sudden release of fluid from your vagina. (You think your water broke.)  · You think that you may be in labor. This means that you've had at least 6 contractions in an hour. · You notice that your baby has stopped moving or is moving much less than normal.  · You have symptoms of a urinary tract infection. These may include:  ? Pain or burning when you urinate. ? A frequent need to urinate without being able to pass much urine. ? Pain in the flank, which is just below the rib cage and above the waist on either side of the back. ? Blood in your urine.   Watch closely for changes in your health, and be sure to contact your doctor if:  · You have vaginal discharge that smells bad.  · You have skin changes, such as:  ? A rash. ? Itching. ? Yellow color to your skin. · You have other concerns about your pregnancy. If you have labor signs at 37 weeks or more  If you have signs of labor at 37 weeks or more, your doctor may tell you to call when your labor becomes more active. Symptoms of active labor include:  · Contractions that are regular. · Contractions that are less than 5 minutes apart. · Contractions that are hard to talk through. Follow-up care is a key part of your treatment and safety. Be sure to make and go to all appointments, and call your doctor if you are having problems. It's also a good idea to know your test results and keep a list of the medicines you take. Where can you learn more? Go to https://3V Transaction ServicespeBringMeTheNews.Eyetronics. org and sign in to your InteRNA Technologies account. Enter  in the [facility] box to learn more about \"Learning About When to Call Your Doctor During Pregnancy (After 20 Weeks). \"     If you do not have an account, please click on the \"Sign Up Now\" link. Current as of: October 8, 2020               Content Version: 12.9  © 0249-0478 Bapul. Care instructions adapted under license by [facility] ([facility]). If you have questions about a medical condition or this instruction, always ask your healthcare professional. [address] any warranty or liability for your use of this information. Patient Education        Counting Your Baby's Kicks: Care Instructions  Your Care Instructions     Counting your baby's kicks is one way your doctor can tell that your baby is healthy. Most women--especially in a first pregnancy--feel their baby move for the first time between 16 and 22 weeks. The movement may feel like flutters rather than kicks. Your baby may move more at certain times of the day. When you are active, you may notice less kicking than when you are resting.  At your prenatal visits, your doctor will ask whether the baby is active. In your last trimester, your doctor may ask you to count the number of times you feel your baby move. Follow-up care is a key part of your treatment and safety. Be sure to make and go to all appointments, and call your doctor if you are having problems. It's also a good idea to know your test results and keep a list of the medicines you take. How do you count fetal kicks? · A common method of checking your baby's movement is to count the number of kicks or moves you feel in 1 hour. Ten movements (such as kicks, flutters, or rolls) in 1 hour are normal. Some doctors suggest that you count in the morning until you get to 10 movements. Then you can quit for that day and start again the next day. · Pick your baby's most active time of day to count. This may be any time from morning to evening. · If you do not feel 10 movements in an hour, your baby may be sleeping. Wait for the next hour and count again. When should you call for help? Call your doctor now or seek immediate medical care if:    · You noticed that your baby has stopped moving or is moving much less than normal.   Watch closely for changes in your health, and be sure to contact your doctor if you have any problems. Where can you learn more? Go to https://Naow.LRN. org and sign in to your Xoinka account. Enter U464 in the [facility] box to learn more about \"Counting Your Baby's Kicks: Care Instructions. \"     If you do not have an account, please click on the \"Sign Up Now\" link. Current as of: October 8, 2020               Content Version: 12.9  © 7349-3869 Healthwise, CipherMax. Care instructions adapted under license by [facility] ([facility]). If you have questions about a medical condition or this instruction, always ask your healthcare professional. [address] any warranty or liability for your use of this information.

## 2021-09-29 ENCOUNTER — ANCILLARY PROCEDURE (OUTPATIENT)
Dept: OBGYN CLINIC | Age: 26
End: 2021-09-29
Payer: MEDICAID

## 2021-09-29 ENCOUNTER — ROUTINE PRENATAL (OUTPATIENT)
Dept: OBGYN CLINIC | Age: 26
End: 2021-09-29
Payer: MEDICAID

## 2021-09-29 VITALS
HEART RATE: 86 BPM | SYSTOLIC BLOOD PRESSURE: 113 MMHG | WEIGHT: 216 LBS | BODY MASS INDEX: 38.26 KG/M2 | DIASTOLIC BLOOD PRESSURE: 73 MMHG

## 2021-09-29 DIAGNOSIS — O36.5930 POOR FETAL GROWTH AFFECTING MANAGEMENT OF MOTHER IN THIRD TRIMESTER, SINGLE OR UNSPECIFIED FETUS: Primary | ICD-10-CM

## 2021-09-29 LAB
GLUCOSE URINE, POC: NEGATIVE
PROTEIN UA: NEGATIVE

## 2021-09-29 PROCEDURE — 76818 FETAL BIOPHYS PROFILE W/NST: CPT | Performed by: OBSTETRICS & GYNECOLOGY

## 2021-09-29 PROCEDURE — 99212 OFFICE O/P EST SF 10 MIN: CPT | Performed by: OBSTETRICS & GYNECOLOGY

## 2021-09-29 PROCEDURE — 99213 OFFICE O/P EST LOW 20 MIN: CPT | Performed by: OBSTETRICS & GYNECOLOGY

## 2021-09-29 PROCEDURE — G8419 CALC BMI OUT NRM PARAM NOF/U: HCPCS | Performed by: OBSTETRICS & GYNECOLOGY

## 2021-09-29 PROCEDURE — 81002 URINALYSIS NONAUTO W/O SCOPE: CPT | Performed by: OBSTETRICS & GYNECOLOGY

## 2021-09-29 PROCEDURE — 76816 OB US FOLLOW-UP PER FETUS: CPT | Performed by: OBSTETRICS & GYNECOLOGY

## 2021-09-29 PROCEDURE — 76820 UMBILICAL ARTERY ECHO: CPT | Performed by: OBSTETRICS & GYNECOLOGY

## 2021-09-29 PROCEDURE — G8427 DOCREV CUR MEDS BY ELIG CLIN: HCPCS | Performed by: OBSTETRICS & GYNECOLOGY

## 2021-09-29 PROCEDURE — 4004F PT TOBACCO SCREEN RCVD TLK: CPT | Performed by: OBSTETRICS & GYNECOLOGY

## 2021-09-29 PROCEDURE — 76821 MIDDLE CEREBRAL ARTERY ECHO: CPT | Performed by: OBSTETRICS & GYNECOLOGY

## 2021-09-29 NOTE — PATIENT INSTRUCTIONS
means that your baby is born through a cut (incision) in your lower belly. In some cases it may be the best choice for the health of you and your baby. Follow-up care is a key part of your treatment and safety. Be sure to make and go to all appointments, and call your doctor if you are having problems. It's also a good idea to know your test results and keep a list of the medicines you take. How can you care for yourself at home? Learn about  birth  · Most C-sections are unplanned. They are done because of problems that occur during labor. These problems might include:  ? Labor that slows or stops. ? High blood pressure or other problems for you.  ? Signs of distress in your baby. These signs may include a very fast or slow heart rate. · Although you and your baby are likely do well after a , it is major surgery. It has more risks than a vaginal delivery. · In some cases, a planned  may be safer than a vaginal delivery. This may be the case if:  ? You have a health problem, such as a heart condition. ? Your baby isn't in a head-down position for delivery. This is called a breech position. ? The uterus has scars from past surgeries. This could increase the chance of a tear in the uterus. ? There is a problem with the placenta.  ? You have an infection, such as genital herpes, that could be spread to your baby. ? You are having twins or more. ? Your baby weighs 9 to 10 pounds or more. · Because of the risks of a , planned C-sections generally should be done only for medical reasons. And a planned  should be done at 39 weeks or later unless there is a medical reason to do it sooner. Know what to expect after delivery, and plan for the first few weeks at home  · You, your baby, and your partner or  will get identification bands. Only people with matching bands can  the baby from the nursery. · You will learn how to feed, diaper, and bathe your baby. And you will learn how to care for the umbilical cord stump. If your baby will be circumcised, you will also learn how to care for that. · Ask people to wait to visit you until you are at home. And ask them to wash their hands before they touch your baby. · Make sure you have another adult in your home for at least 2 or 3 days after the birth. · During the first 2 weeks, limit when friends and family can visit. · Do not allow visitors who have colds or infections. Make sure all visitors are up to date with their vaccinations. Never let anyone smoke around your baby. · Try to nap when the baby naps. Be aware of postpartum depression  · \"Baby blues\" are common for the first 1 to 2 weeks after birth. You may cry or feel sad or irritable for no reason. · Sometimes these feelings last longer and are more intense. This is called postpartum depression. · If your symptoms last for more than a few weeks or you feel very depressed, ask your doctor for help. · Postpartum depression can be treated. Support groups and counseling can help. Sometimes medicine can also help. Where can you learn more? Go to https://Soundhawk Corporationpe"SDC Materials,Inc.".Mapkin. org and sign in to your PWA account. Enter B044 in the [facility] box to learn more about \"Week 38 of Your Pregnancy: Care Instructions. \"     If you do not have an account, please click on the \"Sign Up Now\" link. Current as of: June 16, 2021               Content Version: 13.0  © 1490-7810 Healthwise, [facility]. Care instructions adapted under license by [facility] If you have questions about a medical condition or this instruction, always ask your healthcare professional. [place] [address] any warranty or liability for your use of this information.          Patient Education        Learning About When to Call Your Doctor During Pregnancy (After 20 Weeks)  Overview  It's common to have concerns about what might be a problem when you're pregnant. Most pregnancies don't have any serious problems. But it's still important to know when to call your doctor if you have certain symptoms or signs of labor. These are general suggestions. Your doctor may give you some more information about when to call. When to call your doctor (after 20 weeks)  Call 911  anytime you think you may need emergency care. For example, call if:  · You have severe vaginal bleeding. · You have sudden, severe pain in your belly. · You passed out (lost consciousness). · You have a seizure. · You see or feel the umbilical cord. · You think you are about to deliver your baby and can't make it safely to the hospital.  Call your doctor now or seek immediate medical care if:  · You have vaginal bleeding. · You have belly pain. · You have a fever. · You have symptoms of preeclampsia, such as:  ? Sudden swelling of your face, hands, or feet. ? New vision problems (such as dimness, blurring, or seeing spots). ? A severe headache. · You have a sudden release of fluid from your vagina. (You think your water broke.)  · You think that you may be in labor. This means that you've had at least 6 contractions in an hour. · You notice that your baby has stopped moving or is moving much less than normal.  · You have symptoms of a urinary tract infection. These may include:  ? Pain or burning when you urinate. ? A frequent need to urinate without being able to pass much urine. ? Pain in the flank, which is just below the rib cage and above the waist on either side of the back. ? Blood in your urine. Watch closely for changes in your health, and be sure to contact your doctor if:  · You have vaginal discharge that smells bad. · You have skin changes, such as:  ? A rash. ? Itching. ? Yellow color to your skin. · You have other concerns about your pregnancy.   If you have labor signs at 37 weeks or more  If you have signs of labor at 37 weeks or more, your doctor may tell you to call when your labor becomes more active. Symptoms of active labor include:  · Contractions that are regular. · Contractions that are less than 5 minutes apart. · Contractions that are hard to talk through. Follow-up care is a key part of your treatment and safety. Be sure to make and go to all appointments, and call your doctor if you are having problems. It's also a good idea to know your test results and keep a list of the medicines you take. Where can you learn more? Go to https://triptappeTicketmaster.Sangamo BioSciences. org and sign in to your Game Cooks account. Enter  in the [facility] box to learn more about \"Learning About When to Call Your Doctor During Pregnancy (After 20 Weeks). \"     If you do not have an account, please click on the \"Sign Up Now\" link. Current as of: June 16, 2021               Content Version: 13.0  © 2006-2021 Owlient. Care instructions adapted under license by [facility] ([facility]). If you have questions about a medical condition or this instruction, always ask your healthcare professional. [place] [address] any warranty or liability for your use of this information. Patient Education        Counting Your Baby's Kicks: Care Instructions  Overview     Counting your baby's kicks is one way your doctor can tell that your baby is healthy. Most women--especially in a first pregnancy--feel their baby move for the first time between 16 and 22 weeks. The movement may feel like flutters rather than kicks. Your baby may move more at certain times of the day. When you are active, you may notice less kicking than when you are resting. At your prenatal visits, your doctor will ask whether the baby is active. In your last trimester, your doctor may ask you to count the number of times you feel your baby move. Follow-up care is a key part of your treatment and safety.  Be sure to make and go to all appointments, and call your doctor if you are having problems. It's also a good idea to know your test results and keep a list of the medicines you take. How do you count fetal kicks? · A common method of checking your baby's movement is to note the length of time it takes to count ten movements (such as kicks, flutters, or rolls). · Pick your baby's most active time of day to count. This may be any time from morning to evening. · If you don't feel 10 movements in an hour, have something to eat or drink and count for another hour. If you don't feel at least 10 movements in the 2-hour period, call your doctor. When should you call for help? Call your doctor now or seek immediate medical care if:    · You noticed that your baby has stopped moving or is moving much less than normal.   Watch closely for changes in your health, and be sure to contact your doctor if you have any problems. Where can you learn more? Go to https://Innotech Solar.Asthmatx. org and sign in to your SCL account. Enter F101 in the [employer] box to learn more about \"Counting Your Baby's Kicks: Care Instructions. \"     If you do not have an account, please click on the \"Sign Up Now\" link. Current as of: June 16, 2021               Content Version: 13.0  © 6726-6887 Healthwise, Incorporated. Care instructions adapted under license by [facility] ([facility]). If you have questions about a medical condition or this instruction, always ask your healthcare professional. [place] [address] any warranty or liability for your use of this information.

## 2021-09-29 NOTE — LETTER
[facility] Maternal Fetal Medicine  [address] [place] [id number]  Phone: [phone]  Fax: [phone]           Deangelo Bales MD      October 4, 2021     Patient: Daiana Devine   MR Number: [id number]   YOB: 1995   Date of Visit: 9/29/2021       Dear Dr. Horner Misa:    Thank you for referring Daiana Devine to me for evaluation/treatment. Below are the relevant portions of my assessment and plan of care. If you have questions, please do not hesitate to call me. I look forward to following Abby Morales along with you. Sincerely,        Deangelo Bales MD    CC providers:  Alyssia Salamanca MD  [address]  [facility] [address]  [address] [facility] [address]  [address] [place]

## 2021-10-04 NOTE — PROGRESS NOTES
[facility].  [address] [facility], [address] [facility]. [address]  Ph: [phone] Fax: [phone]     RE:  Luana Sprague  : 1995   AGE: 32 y.o. 2021     Dear Dr Юлия Jensen seen [address] [facility] done . ? Abn US finding o35.8xx0  ? Fetal Problem o36.9  ? High risk pregnancy o09.89  ? Oligohydramnios o41.0  ? Poor growth o36.59  ? Poor OB History o09.29  ? Hx induction 38w oligohydramios   ? Smoking o99.33  ? Marijuana, tobacco   ? ~Note - DSUA neg proteinuria, neg glucosuria today      RHoGam 2021     VSS Afebrile   /73 +FM, Occasional U/C's  - No Active Labor  No Bleeding - No PPROM  ? Within developmental and technical limits of ultrasound assessment,   ? Vertex Female fetus @  38w 3d   ? Active, responsive baby. The amniotic fluid volume appears normal.   ? The fetus is measuring SMALL for gestational age  - 56g ( 5:13)  13%Ile . ? There has been good interval growth and development . ? Fetal anatomy was reviewed and appears normal.  ? Transabdominal views of cervix appears to be closed, long, without significant funneling upon Valsalva. ? The biophysical profile is reassuring with a score of 8/8.   ? Umbilical artery Doppler studies are reassuring with good end-diastolic flow. - Middle Cerebral Artery flows are reassuring with appropriate impedance and adaptation.   - Cerebroplacental ratio is favorable ( >1.0)      Impression - Stable - Term   Recommend delivery by 40w/EDC     1. Term  concerns and precautions reviewed with patient. Discussed fetal movements and the role of  testing to help optimize growth and development and help predict/ avoid stress. Discussed trouble signs to watch for. 2. The patient is to continue to follow with you in your office for ongoing obstetric care. 3. Followup visit    PRN   4. MFM Appointment has NOT  been scheduled   5.  I advised the patient that the [employer] of Obstetrics and Gynecology and The Society for Maternal Fetal Medicine recommend that all pregnant women be vaccinated for COVID-19. Information provided   6. Further evaluation and management will be dependent on her clinical presentation and the results of her testing. Once again, thank you for allowing us to participate in the care of this patient and if we can be of any further assistance to you, please do not hesitate to contact us. Sincerely,      Jenny Johns MD  I was present during her visit , supervised the ultrasound examination and provided the patient evaluation and management. The total time in minutes spent regarding the patient today, reviewing medical records, reviewing imaging studies, performing ultrasonic imaging, reviewing laboratory testing, and documenting information was 16 minutes, of which, 50% of the time was spent in patient education, counseling, and coordinating care with the patient, her provider, and/or her family. I answered all of her questions to her satisfaction.  I asked her to call if she had any additional questions prior to her next visit

## 2021-10-07 ENCOUNTER — HOSPITAL ENCOUNTER (OUTPATIENT)
Age: 26
Discharge: HOME | End: 2021-10-07
Attending: OBSTETRICS & GYNECOLOGY | Admitting: OBSTETRICS & GYNECOLOGY
Payer: MEDICAID

## 2021-10-07 VITALS
HEIGHT: 63 IN | TEMPERATURE: 98.2 F | BODY MASS INDEX: 38.27 KG/M2 | DIASTOLIC BLOOD PRESSURE: 56 MMHG | RESPIRATION RATE: 16 BRPM | HEART RATE: 78 BPM | SYSTOLIC BLOOD PRESSURE: 109 MMHG | WEIGHT: 216 LBS

## 2021-10-07 PROBLEM — Z64.1 MULTIPARITY: Status: ACTIVE | Noted: 2021-10-07

## 2021-10-07 PROBLEM — Z3A.39 39 WEEKS GESTATION OF PREGNANCY: Status: ACTIVE | Noted: 2021-10-07

## 2021-10-07 LAB
AMNISURE, POC: NEGATIVE
Lab: NORMAL
NEGATIVE QC PASS/FAIL: NORMAL
POSITIVE QC PASS/FAIL: NORMAL

## 2021-10-07 PROCEDURE — 99211 OFF/OP EST MAY X REQ PHY/QHP: CPT

## 2021-10-07 NOTE — H&P
heat/cold intolerance, no polyphagia, polydipsia or polyuria  Hem: no increased bleeding, no bruising, no lymphadenopathy  Skin: no skin changes  Psych: no depressed mood, no suicidal ideation    Social History:     reports that she has been smoking cigarettes. She has been smoking about 0.50 packs per day. She has never used smokeless tobacco. She reports previous alcohol use. She reports current drug use. Drug: Marijuana. Medications Prior to Admission:  Medications Prior to Admission: famotidine (PEPCID) 20 MG tablet, Take 20 mg by mouth 2 times daily  folic acid (FOLVITE) 1 MG tablet, Take 1 tablet by mouth daily  vitamin B-6 (PYRIDOXINE) 50 MG tablet, Take 25 mg by mouth daily  Doxylamine Succinate, Sleep, (UNISOM PO), Take by mouth  Prenatal MV-Min-Fe Fum-FA-DHA (PRENATAL 1 PO), Take by mouth  sertraline (ZOLOFT) 20 MG/ML concentrated solution, Take 10 mg by mouth daily    Allergies:  Patient has no known allergies. PHYSICAL EXAM:  BP (!) 109/56   Pulse 78   Temp 98.2 °F (36.8 °C)   Resp 16   Ht 5' 3\" (1.6 m)   Wt 216 lb (98 kg)   LMP 01/03/2021   BMI 38.26 kg/m²   General appearance: Comfortable  Lungs:  CTA   Heart:  Regular Rhythm  Abdomen:  Soft, non-tender, gravid  Fetal heart rate:  Baseline Heart Rate 100-110, accelerations: present    Cervix:    DILATION: 2 cm  EFFACEMENT:   50%  STATION:  -2 cm  CONSISTENCY:  medium  POSITION:  posterior  Presentation:Cephalic  Contraction frequency:  irregular   Membranes:  Intact,amnisure neg      ASSESSMENT     IUP at 39 weeks. Intermittent ctx's  fht's 100-110 with accels  Amnisure neg         Plan: dr Jero Geronimo called.  Left message to call labor and delivery          Electronically signed by Harvin Rubinstein, MD on 10/7/2021 at 4:46 PM

## 2021-10-08 NOTE — PROGRESS NOTES
Dr Patino Floor updated on pt status, low baseline, and irregular contractions.   Discharge order received

## 2021-10-08 NOTE — PROGRESS NOTES
Discharge ambulatory to home with instructions for kick counts and labor precautions.   Pt verbalizes understanding

## 2021-10-16 ENCOUNTER — APPOINTMENT (OUTPATIENT)
Dept: LABOR AND DELIVERY | Age: 26
DRG: 560 | End: 2021-10-16
Payer: MEDICAID

## 2021-10-16 ENCOUNTER — ANESTHESIA (OUTPATIENT)
Dept: LABOR AND DELIVERY | Age: 26
DRG: 560 | End: 2021-10-16
Payer: MEDICAID

## 2021-10-16 ENCOUNTER — ANESTHESIA EVENT (OUTPATIENT)
Dept: LABOR AND DELIVERY | Age: 26
DRG: 560 | End: 2021-10-16
Payer: MEDICAID

## 2021-10-16 ENCOUNTER — HOSPITAL ENCOUNTER (INPATIENT)
Age: 26
LOS: 2 days | Discharge: HOME | DRG: 560 | End: 2021-10-18
Attending: OBSTETRICS & GYNECOLOGY | Admitting: OBSTETRICS & GYNECOLOGY
Payer: MEDICAID

## 2021-10-16 PROBLEM — Z3A.40 40 WEEKS GESTATION OF PREGNANCY: Status: ACTIVE | Noted: 2021-10-16

## 2021-10-16 LAB
ABO/RH: NORMAL
AMPHETAMINE SCREEN, URINE: NOT DETECTED
ANTIBODY SCREEN: NORMAL
BARBITURATE SCREEN URINE: NOT DETECTED
BENZODIAZEPINE SCREEN, URINE: NOT DETECTED
CANNABINOID SCREEN URINE: POSITIVE
COCAINE METABOLITE SCREEN URINE: NOT DETECTED
FENTANYL SCREEN, URINE: NOT DETECTED
HCT VFR BLD CALC: 34 % (ref 34–48)
HEMOGLOBIN: 11.3 G/DL (ref 11.5–15.5)
Lab: ABNORMAL
MCH RBC QN AUTO: 30.1 PG (ref 26–35)
MCHC RBC AUTO-ENTMCNC: 33.2 % (ref 32–34.5)
MCV RBC AUTO: 90.7 FL (ref 80–99.9)
METHADONE SCREEN, URINE: NOT DETECTED
OPIATE SCREEN URINE: NOT DETECTED
OXYCODONE URINE: NOT DETECTED
PDW BLD-RTO: 13.2 FL (ref 11.5–15)
PHENCYCLIDINE SCREEN URINE: NOT DETECTED
PLATELET # BLD: 172 E9/L (ref 130–450)
PMV BLD AUTO: 11.7 FL (ref 7–12)
RBC # BLD: 3.75 E12/L (ref 3.5–5.5)
WBC # BLD: 12.9 E9/L (ref 4.5–11.5)

## 2021-10-16 PROCEDURE — 80307 DRUG TEST PRSMV CHEM ANLYZR: CPT

## 2021-10-16 PROCEDURE — 86850 RBC ANTIBODY SCREEN: CPT

## 2021-10-16 PROCEDURE — 10907ZC DRAINAGE OF AMNIOTIC FLUID, THERAPEUTIC FROM PRODUCTS OF CONCEPTION, VIA NATURAL OR ARTIFICIAL OPENING: ICD-10-PCS | Performed by: OBSTETRICS & GYNECOLOGY

## 2021-10-16 PROCEDURE — 3700000025 EPIDURAL BLOCK: Performed by: ANESTHESIOLOGY

## 2021-10-16 PROCEDURE — 1220000000 HC SEMI PRIVATE OB R&B

## 2021-10-16 PROCEDURE — 6370000000 HC RX 637 (ALT 250 FOR IP)

## 2021-10-16 PROCEDURE — 3E033VJ INTRODUCTION OF OTHER HORMONE INTO PERIPHERAL VEIN, PERCUTANEOUS APPROACH: ICD-10-PCS | Performed by: OBSTETRICS & GYNECOLOGY

## 2021-10-16 PROCEDURE — 36415 COLL VENOUS BLD VENIPUNCTURE: CPT

## 2021-10-16 PROCEDURE — 6360000002 HC RX W HCPCS: Performed by: OBSTETRICS & GYNECOLOGY

## 2021-10-16 PROCEDURE — 2580000003 HC RX 258: Performed by: OBSTETRICS & GYNECOLOGY

## 2021-10-16 PROCEDURE — G0480 DRUG TEST DEF 1-7 CLASSES: HCPCS

## 2021-10-16 PROCEDURE — 51701 INSERT BLADDER CATHETER: CPT

## 2021-10-16 PROCEDURE — 86901 BLOOD TYPING SEROLOGIC RH(D): CPT

## 2021-10-16 PROCEDURE — 85027 COMPLETE CBC AUTOMATED: CPT

## 2021-10-16 PROCEDURE — 7200000001 HC VAGINAL DELIVERY

## 2021-10-16 PROCEDURE — 2500000003 HC RX 250 WO HCPCS: Performed by: ANESTHESIOLOGY

## 2021-10-16 PROCEDURE — 86900 BLOOD TYPING SEROLOGIC ABO: CPT

## 2021-10-16 RX ORDER — ONDANSETRON 2 MG/ML
4 INJECTION INTRAMUSCULAR; INTRAVENOUS EVERY 6 HOURS PRN
Status: DISCONTINUED | OUTPATIENT
Start: 2021-10-16 | End: 2021-10-18 | Stop reason: HOSPADM

## 2021-10-16 RX ORDER — SODIUM CHLORIDE, SODIUM LACTATE, POTASSIUM CHLORIDE, CALCIUM CHLORIDE 600; 310; 30; 20 MG/100ML; MG/100ML; MG/100ML; MG/100ML
INJECTION, SOLUTION INTRAVENOUS CONTINUOUS
Status: DISCONTINUED | OUTPATIENT
Start: 2021-10-16 | End: 2021-10-16

## 2021-10-16 RX ORDER — NALOXONE HYDROCHLORIDE 0.4 MG/ML
0.4 INJECTION, SOLUTION INTRAMUSCULAR; INTRAVENOUS; SUBCUTANEOUS PRN
Status: DISCONTINUED | OUTPATIENT
Start: 2021-10-16 | End: 2021-10-16

## 2021-10-16 RX ORDER — FERROUS SULFATE 325(65) MG
325 TABLET ORAL 2 TIMES DAILY WITH MEALS
Status: DISCONTINUED | OUTPATIENT
Start: 2021-10-17 | End: 2021-10-18 | Stop reason: HOSPADM

## 2021-10-16 RX ORDER — IBUPROFEN 800 MG/1
TABLET ORAL
Status: COMPLETED
Start: 2021-10-16 | End: 2021-10-16

## 2021-10-16 RX ORDER — SODIUM CHLORIDE, SODIUM LACTATE, POTASSIUM CHLORIDE, AND CALCIUM CHLORIDE .6; .31; .03; .02 G/100ML; G/100ML; G/100ML; G/100ML
500 INJECTION, SOLUTION INTRAVENOUS PRN
Status: DISCONTINUED | OUTPATIENT
Start: 2021-10-16 | End: 2021-10-16

## 2021-10-16 RX ORDER — MODIFIED LANOLIN
OINTMENT (GRAM) TOPICAL PRN
Status: DISCONTINUED | OUTPATIENT
Start: 2021-10-16 | End: 2021-10-18 | Stop reason: HOSPADM

## 2021-10-16 RX ORDER — SIMETHICONE 80 MG
80 TABLET,CHEWABLE ORAL EVERY 6 HOURS PRN
Status: DISCONTINUED | OUTPATIENT
Start: 2021-10-16 | End: 2021-10-18 | Stop reason: HOSPADM

## 2021-10-16 RX ORDER — HYDROCODONE BITARTRATE AND ACETAMINOPHEN 5; 325 MG/1; MG/1
1 TABLET ORAL EVERY 4 HOURS PRN
Status: DISCONTINUED | OUTPATIENT
Start: 2021-10-16 | End: 2021-10-18 | Stop reason: HOSPADM

## 2021-10-16 RX ORDER — BISACODYL 10 MG
10 SUPPOSITORY, RECTAL RECTAL DAILY PRN
Status: DISCONTINUED | OUTPATIENT
Start: 2021-10-16 | End: 2021-10-18 | Stop reason: HOSPADM

## 2021-10-16 RX ORDER — LIDOCAINE HYDROCHLORIDE 10 MG/ML
INJECTION, SOLUTION INFILTRATION; PERINEURAL
Status: DISCONTINUED
Start: 2021-10-16 | End: 2021-10-16

## 2021-10-16 RX ORDER — DOCUSATE SODIUM 100 MG/1
100 CAPSULE, LIQUID FILLED ORAL 2 TIMES DAILY
Status: DISCONTINUED | OUTPATIENT
Start: 2021-10-16 | End: 2021-10-18 | Stop reason: HOSPADM

## 2021-10-16 RX ORDER — ACETAMINOPHEN 325 MG/1
650 TABLET ORAL EVERY 4 HOURS PRN
Status: DISCONTINUED | OUTPATIENT
Start: 2021-10-16 | End: 2021-10-18 | Stop reason: HOSPADM

## 2021-10-16 RX ORDER — CALCIUM CARBONATE 200(500)MG
TABLET,CHEWABLE ORAL
Status: COMPLETED
Start: 2021-10-16 | End: 2021-10-16

## 2021-10-16 RX ORDER — ONDANSETRON 4 MG/1
8 TABLET, ORALLY DISINTEGRATING ORAL EVERY 8 HOURS PRN
Status: DISCONTINUED | OUTPATIENT
Start: 2021-10-16 | End: 2021-10-18 | Stop reason: HOSPADM

## 2021-10-16 RX ORDER — SERTRALINE HYDROCHLORIDE 20 MG/ML
10 SOLUTION ORAL DAILY
Status: DISCONTINUED | OUTPATIENT
Start: 2021-10-17 | End: 2021-10-18 | Stop reason: HOSPADM

## 2021-10-16 RX ORDER — IBUPROFEN 800 MG/1
800 TABLET ORAL EVERY 8 HOURS
Status: DISCONTINUED | OUTPATIENT
Start: 2021-10-17 | End: 2021-10-18 | Stop reason: HOSPADM

## 2021-10-16 RX ORDER — NALBUPHINE HCL 10 MG/ML
5 AMPUL (ML) INJECTION EVERY 4 HOURS PRN
Status: DISCONTINUED | OUTPATIENT
Start: 2021-10-16 | End: 2021-10-16

## 2021-10-16 RX ORDER — SODIUM CHLORIDE, SODIUM LACTATE, POTASSIUM CHLORIDE, AND CALCIUM CHLORIDE .6; .31; .03; .02 G/100ML; G/100ML; G/100ML; G/100ML
1000 INJECTION, SOLUTION INTRAVENOUS PRN
Status: DISCONTINUED | OUTPATIENT
Start: 2021-10-16 | End: 2021-10-16

## 2021-10-16 RX ORDER — PENICILLIN G 3000000 [IU]/50ML
3 INJECTION, SOLUTION INTRAVENOUS EVERY 4 HOURS
Status: DISCONTINUED | OUTPATIENT
Start: 2021-10-16 | End: 2021-10-16

## 2021-10-16 RX ORDER — ACETAMINOPHEN 650 MG
TABLET, EXTENDED RELEASE ORAL
Status: DISCONTINUED
Start: 2021-10-16 | End: 2021-10-16

## 2021-10-16 RX ORDER — ONDANSETRON 2 MG/ML
4 INJECTION INTRAMUSCULAR; INTRAVENOUS EVERY 6 HOURS PRN
Status: DISCONTINUED | OUTPATIENT
Start: 2021-10-16 | End: 2021-10-16

## 2021-10-16 RX ADMIN — PENICILLIN G 3 MILLION UNITS: 3000000 INJECTION, SOLUTION INTRAVENOUS at 13:21

## 2021-10-16 RX ADMIN — ONDANSETRON 4 MG: 2 INJECTION INTRAMUSCULAR; INTRAVENOUS at 14:56

## 2021-10-16 RX ADMIN — PENICILLIN G POTASSIUM 5 MILLION UNITS: 5000000 INJECTION, POWDER, FOR SOLUTION INTRAMUSCULAR; INTRAVENOUS at 09:30

## 2021-10-16 RX ADMIN — Medication 1 MILLI-UNITS/MIN: at 09:30

## 2021-10-16 RX ADMIN — IBUPROFEN 800 MG: 800 TABLET, FILM COATED ORAL at 22:06

## 2021-10-16 RX ADMIN — CALCIUM CARBONATE 1000 MG: 500 TABLET, CHEWABLE ORAL at 22:07

## 2021-10-16 RX ADMIN — BUTORPHANOL TARTRATE 2 MG: 2 INJECTION, SOLUTION INTRAMUSCULAR; INTRAVENOUS at 15:27

## 2021-10-16 RX ADMIN — SODIUM CHLORIDE, POTASSIUM CHLORIDE, SODIUM LACTATE AND CALCIUM CHLORIDE: 600; 310; 30; 20 INJECTION, SOLUTION INTRAVENOUS at 08:00

## 2021-10-16 RX ADMIN — Medication 15 ML/HR: at 16:30

## 2021-10-16 RX ADMIN — PENICILLIN G 3 MILLION UNITS: 3000000 INJECTION, SOLUTION INTRAVENOUS at 17:35

## 2021-10-16 ASSESSMENT — LIFESTYLE VARIABLES: SMOKING_STATUS: 1

## 2021-10-16 ASSESSMENT — PAIN SCALES - GENERAL
PAINLEVEL_OUTOF10: 9
PAINLEVEL_OUTOF10: 3

## 2021-10-16 NOTE — PROGRESS NOTES
Dr Little Potter called for orders.  Orders obtained to start Pitocin, PCN 5mil --> 3mil Q4, then AROM once 2nd dose of PCN is running

## 2021-10-16 NOTE — PROGRESS NOTES
Dr. Roman Oreilly on unit. Notified of AROM at [phone], 2/70/-2. Also notified of low baseline of 105-110.  Continue with plan of care

## 2021-10-16 NOTE — H&P
intolerance, no polyphagia, polydipsia or polyuria  Hem: no increased bleeding, no bruising, no lymphadenopathy  Skin: no skin changes  Psych: no depressed mood, no suicidal ideation    Social History:     reports that she has been smoking cigarettes. She has been smoking about 0.50 packs per day. She has never used smokeless tobacco. She reports previous alcohol use. She reports current drug use. Drug: Marijuana. Medications Prior to Admission:  Medications Prior to Admission: famotidine (PEPCID) 20 MG tablet, Take 20 mg by mouth 2 times daily  folic acid (FOLVITE) 1 MG tablet, Take 1 tablet by mouth daily  vitamin B-6 (PYRIDOXINE) 50 MG tablet, Take 25 mg by mouth daily  Doxylamine Succinate, Sleep, (UNISOM PO), Take by mouth  Prenatal MV-Min-Fe Fum-FA-DHA (PRENATAL 1 PO), Take by mouth  sertraline (ZOLOFT) 20 MG/ML concentrated solution, Take 10 mg by mouth daily    Allergies:  Patient has no known allergies. PHYSICAL EXAM:  /88   Pulse 52   Temp 98 °F (36.7 °C) (Oral)   Resp 16   Ht 5' 3\" (1.6 m)   Wt 218 lb (98.9 kg)   LMP 01/03/2021   SpO2 98%   BMI 38.62 kg/m²   General appearance: Comfortable  Lungs:  CTA   Heart:  Regular Rhythm  Abdomen:  Soft, non-tender, gravid  Fetal heart rate:  Baseline Heart Rate 110, accelerations: present    Cervix:    DILATION: 2 cm  EFFACEMENT:   70  STATION:  -2 cm  CONSISTENCY:  medium  POSITION:  posterior  Presentation:Cephalic  Contraction frequency:  irregular  Membranes:  arom clear      ASSESSMENT     IUP at 40 weeks.   induction         Plan: arom per dr Cristina Rodriguez request          Electronically signed by Gladis Sy MD on 10/16/2021 at 2:32 PM

## 2021-10-16 NOTE — ANESTHESIA PRE PROCEDURE
Department of Anesthesiology  Preprocedure Note       Name:  Willa Alfaro   Age:  32 y.o.  :  1995                                          MRN:  [id number]         Date:  10/16/2021      Surgeon: * Surgery not found *    Procedure:     Medications prior to admission:   Prior to Admission medications    Medication Sig Start Date End Date Taking?  Authorizing Provider   famotidine (PEPCID) 20 MG tablet Take 20 mg by mouth 2 times daily   Yes Historical Provider, MD   folic acid (FOLVITE) 1 MG tablet Take 1 tablet by mouth daily 21  Yes Jose Espana MD   vitamin B-6 (PYRIDOXINE) 50 MG tablet Take 25 mg by mouth daily   Yes Historical Provider, MD   Doxylamine Succinate, Sleep, (UNISOM PO) Take by mouth   Yes Historical Provider, MD   Prenatal MV-Min-Fe Fum-FA-DHA (PRENATAL 1 PO) Take by mouth   Yes Historical Provider, MD   sertraline (ZOLOFT) 20 MG/ML concentrated solution Take 10 mg by mouth daily   Yes Historical Provider, MD       Current medications:    Current Facility-Administered Medications   Medication Dose Route Frequency Provider Last Rate Last Admin    lactated ringers infusion   IntraVENous Continuous Mike Chester,  mL/hr at 10/16/21 0800 New Bag at 10/16/21 0800    lactated ringers bolus  500 mL IntraVENous PRN Mike Chester DO        Or    lactated ringers bolus  1,000 mL IntraVENous PRN Mike Chester, DO        oxytocin (PITOCIN) 30 units in 500 mL infusion  87.3 cooper-units/min IntraVENous Continuous PRN Mike Chester DO        And    oxytocin (PITOCIN) 10 unit bolus from the bag  10 Units IntraVENous PRN Mike Chester, DO        ondansetron [facility]) injection 4 mg  4 mg IntraVENous Q6H PRN Mike Chester, DO        oxytocin (PITOCIN) 30 units in 500 mL infusion  1-20 cooper-units/min IntraVENous Continuous Mike Chester, DO        penicillin G potassium 5 Million Units in dextrose 5 % 100 mL IVPB (mini-bag)  5 Million Units IntraVENous Once Vinicius Redman DO        Followed by   Radha Thomas penicillin G potassium IVPB 3 Million Units  3 Million Units IntraVENous Q4H Vinicius Redman DO           Allergies:  No Known Allergies    Problem List:    Patient Active Problem List   Diagnosis Code    History of blood transfusion Z92.89    Nausea and vomiting during pregnancy O21.9    Poor fetal growth affecting management of mother in second trimester O39.0    History of oligohydramnios in prior pregnancy, currently pregnant in second trimester O200.18    Family history of ovarian cancer Z80.41    Marijuana use F12.90    Depression affecting pregnancy O99.340, F32. A    34 weeks gestation of pregnancy Z3A.34    Multiparity Z64.1    39 weeks gestation of pregnancy Z3A.39    40 weeks gestation of pregnancy Z3A.40       Past Medical History:        Diagnosis Date    Depression     History of blood transfusion     after first labor       Past Surgical History:  History reviewed. No pertinent surgical history.     Social History:    Social History     Tobacco Use    Smoking status: Current Every Day Smoker     Packs/day: 0.50     Types: Cigarettes    Smokeless tobacco: Never Used   Substance Use Topics    Alcohol use: Not Currently                                Ready to quit: Not Answered  Counseling given: Not Answered      Vital Signs (Current):   Vitals:    10/16/21 0806 10/16/21 0809   BP: 129/69    Pulse: 96    Resp: 16    Temp: 36.7 °C (98 °F)    TempSrc: Oral    SpO2: 98%    Weight:  218 lb (98.9 kg)   Height:  5' 3\" (1.6 m)                                              BP Readings from Last 3 Encounters:   10/16/21 129/69   10/15/21 123/79   10/07/21 (!) 109/56       NPO Status: Time of last liquid consumption: 0700                        Time of last solid consumption: 0700                        Date of last liquid consumption: 10/16/21                        Date of last solid food consumption: 10/16/21    BMI:   Wt Readings from Last 3 Encounters:   10/16/21 218 lb (98.9 kg)   10/15/21 218 lb (98.9 kg)   10/07/21 216 lb (98 kg)     Body mass index is 38.62 kg/m². CBC:   Lab Results   Component Value Date    WBC 12.9 10/16/2021    RBC 3.75 10/16/2021    HGB 11.3 10/16/2021    HCT 34.0 10/16/2021    MCV 90.7 10/16/2021    RDW 13.2 10/16/2021     10/16/2021       CMP:   Lab Results   Component Value Date     08/29/2021    K 3.9 08/29/2021     08/29/2021    CO2 22 08/29/2021    BUN 8 08/29/2021    CREATININE 0.7 08/29/2021    GFRAA >60 08/29/2021    LABGLOM >60 08/29/2021    GLUCOSE 108 08/29/2021    PROT 6.8 08/29/2021    CALCIUM 8.9 08/29/2021    BILITOT 0.2 08/29/2021    ALKPHOS 165 08/29/2021    AST 18 08/29/2021    ALT 11 08/29/2021       POC Tests: No results for input(s): POCGLU, POCNA, POCK, POCCL, POCBUN, POCHEMO, POCHCT in the last 72 hours.     Coags: No results found for: PROTIME, INR, APTT    HCG (If Applicable): No results found for: PREGTESTUR, PREGSERUM, HCG, HCGQUANT     ABGs: No results found for: PHART, PO2ART, NUF6WTV, MKU4KCQ, BEART, P8GMZZZK     Type & Screen (If Applicable):  No results found for: LABABO, LABRH    Drug/Infectious Status (If Applicable):  No results found for: HIV, HEPCAB    COVID-19 Screening (If Applicable): No results found for: COVID19        Anesthesia Evaluation  Patient summary reviewed and Nursing notes reviewed no history of anesthetic complications:   Airway: Mallampati: III  TM distance: >3 FB   Neck ROM: full  Mouth opening: > = 3 FB Dental: normal exam         Pulmonary: breath sounds clear to auscultation  (+) current smoker                          ROS comment: Marijuana use per PT   Cardiovascular:Negative CV ROS  Exercise tolerance: good (>4 METS),           Rhythm: regular  Rate: normal           Beta Blocker:  Not on Beta Blocker         Neuro/Psych:   (+) psychiatric history: stable with treatmentdepression/anxiety             GI/Hepatic/Renal:   (+) GERD: well controlled, Endo/Other: Negative Endo/Other ROS                    Abdominal:             Vascular: negative vascular ROS. Other Findings:           Anesthesia Plan      general, spinal and epidural     ASA 2     (20g RW PIV)        Anesthetic plan and risks discussed with patient. Use of blood products discussed with patient whom consented to blood products. Plan discussed with CRNA and attending.                   Mary Mejia RN   10/16/2021

## 2021-10-16 NOTE — ANESTHESIA PROCEDURE NOTES
Epidural Block    Patient location during procedure: OB  Start time: 10/16/2021 4:20 PM  End time: 10/16/2021 4:35 PM  Reason for block: labor epidural  Staffing  Performed: other anesthesia staff   Anesthesiologist: Rafat Padilla MD  Resident/CRNA: IAIN Robertson - BRIAN  Other anesthesia staff: Alexx Payne RN  Preanesthetic Checklist  Completed: patient identified, IV checked, site marked, risks and benefits discussed, surgical consent, monitors and equipment checked, pre-op evaluation, timeout performed, anesthesia consent given, oxygen available and patient being monitored  Epidural  Patient position: sitting  Prep: ChloraPrep  Patient monitoring: cardiac monitor, continuous pulse ox and frequent blood pressure checks  Approach: midline  Location: lumbar (1-5)  Injection technique: MARIANA air  Provider prep: mask and sterile gloves  Needle  Needle type: Tuohy   Needle gauge: 18 G  Needle length: 2.5 in  Needle insertion depth: 5 cm  Catheter type: end hole  Catheter size: 20 G.   Catheter at skin depth: 11 cm  Test dose: negative  Assessment  Hemodynamics: stable  Attempts: 1  Additional Notes  Epidural pump alarms occlusion and unable to flush the cathter manually, pt agrees for replacement and epidural catheter d/c'd tip intact and replaced without difficulty

## 2021-10-17 LAB
FETAL SCREEN: NORMAL
HCT VFR BLD CALC: 30.2 % (ref 34–48)
HEMOGLOBIN: 10 G/DL (ref 11.5–15.5)
RHIG LOT NUMBER: NORMAL

## 2021-10-17 PROCEDURE — 1220000000 HC SEMI PRIVATE OB R&B

## 2021-10-17 PROCEDURE — 85461 HEMOGLOBIN FETAL: CPT

## 2021-10-17 PROCEDURE — 6370000000 HC RX 637 (ALT 250 FOR IP): Performed by: OBSTETRICS & GYNECOLOGY

## 2021-10-17 PROCEDURE — 96372 THER/PROPH/DIAG INJ SC/IM: CPT

## 2021-10-17 PROCEDURE — 36415 COLL VENOUS BLD VENIPUNCTURE: CPT

## 2021-10-17 PROCEDURE — 85018 HEMOGLOBIN: CPT

## 2021-10-17 PROCEDURE — 6360000002 HC RX W HCPCS: Performed by: OBSTETRICS & GYNECOLOGY

## 2021-10-17 PROCEDURE — 85014 HEMATOCRIT: CPT

## 2021-10-17 PROCEDURE — 6370000000 HC RX 637 (ALT 250 FOR IP)

## 2021-10-17 RX ORDER — CALCIUM CARBONATE 200(500)MG
500 TABLET,CHEWABLE ORAL 3 TIMES DAILY PRN
Status: DISCONTINUED | OUTPATIENT
Start: 2021-10-17 | End: 2021-10-18 | Stop reason: HOSPADM

## 2021-10-17 RX ORDER — CALCIUM CARBONATE 200(500)MG
TABLET,CHEWABLE ORAL
Status: COMPLETED
Start: 2021-10-17 | End: 2021-10-17

## 2021-10-17 RX ADMIN — DOCUSATE SODIUM 100 MG: 100 CAPSULE ORAL at 19:39

## 2021-10-17 RX ADMIN — Medication 500 MG: at 11:19

## 2021-10-17 RX ADMIN — HUMAN RHO(D) IMMUNE GLOBULIN 300 MCG: 300 INJECTION, SOLUTION INTRAMUSCULAR at 11:55

## 2021-10-17 RX ADMIN — IBUPROFEN 800 MG: 800 TABLET, FILM COATED ORAL at 19:39

## 2021-10-17 RX ADMIN — IBUPROFEN 800 MG: 800 TABLET, FILM COATED ORAL at 07:55

## 2021-10-17 RX ADMIN — DOCUSATE SODIUM 100 MG: 100 CAPSULE ORAL at 07:55

## 2021-10-17 RX ADMIN — CALCIUM CARBONATE 500 MG: 500 TABLET, CHEWABLE ORAL at 11:19

## 2021-10-17 ASSESSMENT — PAIN SCALES - GENERAL
PAINLEVEL_OUTOF10: 0
PAINLEVEL_OUTOF10: 3

## 2021-10-17 NOTE — PROGRESS NOTES
Patient admitted to room 308. Oriented to room and floor. Admission paperwork [facility] with patient[facility]. Patient states she has already had her flu and TDap vaccine. Assessment as charted. VSS. Call light within reach.

## 2021-10-17 NOTE — ANESTHESIA POSTPROCEDURE EVALUATION
Department of Anesthesiology  Postprocedure Note    Patient: Geralyn Najjar  MRN: [id number]  YOB: 1995  Date of evaluation: 10/17/2021  Time:  8:36 AM     Procedure Summary     Date: 10/16/21 Room / Location:     Anesthesia Start: 1620 Anesthesia Stop: 2027    Procedure: Labor Analgesia Diagnosis:     Scheduled Providers:  Responsible Provider: Luz Elena Moffett MD    Anesthesia Type: general, spinal, epidural ASA Status: 2          Anesthesia Type: general, spinal, epidural    Lisa Phase I:      Lisa Phase II:      Last vitals: Reviewed and per EMR flowsheets.        Anesthesia Post Evaluation    Patient location during evaluation: bedside  Patient participation: complete - patient participated  Level of consciousness: awake and alert  Pain score: 0  Nausea & Vomiting: no nausea and no vomiting  Complications: no  Cardiovascular status: hemodynamically stable  Respiratory status: room air and acceptable  Hydration status: stable

## 2021-10-17 NOTE — PROGRESS NOTES
Progress Note    SUBJECTIVE:   Pt comfortable; +void; +flatus; +ambulation; decreased vaginal bleeding    OBJECTIVE:    VITALS:  /62   Pulse 69   Temp 97.9 °F (36.6 °C) (Oral)   Resp 16   Ht 5' 3\" (1.6 m)   Wt 218 lb (98.9 kg)   LMP 2021   SpO2 98%   Breastfeeding Unknown   BMI 38.62 kg/m²   Physical Exam  ABDOMEN:  normal bowel sounds, non-distended, non-tender and uterus firm  Ext nt    DATA:      ASSESSMENT AND PLAN:  S/p   Active Problems:    40 weeks gestation of pregnancy  Resolved Problems:    * No resolved hospital problems.  *      PPD#1  Plan discharge home tomorrow

## 2021-10-17 NOTE — PLAN OF CARE
Problem: VAGINAL DELIVERY - RECOVERY AND POST PARTUM  Goal: Vital signs are medically acceptable  Outcome: Met This Shift     Problem: VAGINAL DELIVERY - RECOVERY AND POST PARTUM  Goal: Fundus firm at midline  Outcome: Met This Shift     Problem: VAGINAL DELIVERY - RECOVERY AND POST PARTUM  Goal: Empties bladder  Outcome: Met This Shift     Problem: VAGINAL DELIVERY - RECOVERY AND POST PARTUM  Goal: Appropriate behavior observed  Outcome: Met This Shift     Problem: VAGINAL DELIVERY - RECOVERY AND POST PARTUM  Goal: Positive Mother-Baby interactions are observed  Outcome: Met This Shift

## 2021-10-17 NOTE — L&D DELIVERY NOTE
25yo  @ 40+ wks delivered via spontaneous vaginal delivery under epidural anesthesia over no episiotomy a female infant at  weighing 7# 1oz apgars 8,9. 27 second cord clamp delay performed. Placenta with 3VC intact. Baby bulb suctioned and cord blood and gases obtained. ebl 100cc. Should delivered without difficulty.

## 2021-10-18 VITALS
HEIGHT: 63 IN | RESPIRATION RATE: 18 BRPM | BODY MASS INDEX: 38.62 KG/M2 | HEART RATE: 52 BPM | SYSTOLIC BLOOD PRESSURE: 120 MMHG | OXYGEN SATURATION: 97 % | DIASTOLIC BLOOD PRESSURE: 76 MMHG | TEMPERATURE: 98.2 F | WEIGHT: 218 LBS

## 2021-10-18 PROCEDURE — 6370000000 HC RX 637 (ALT 250 FOR IP): Performed by: OBSTETRICS & GYNECOLOGY

## 2021-10-18 RX ADMIN — DOCUSATE SODIUM 100 MG: 100 CAPSULE ORAL at 08:16

## 2021-10-18 ASSESSMENT — PAIN DESCRIPTION - RADICULAR PAIN: RADICULAR_PAIN: ABSENT

## 2021-10-18 ASSESSMENT — PAIN SCALES - GENERAL: PAINLEVEL_OUTOF10: 1

## 2021-10-18 NOTE — PLAN OF CARE
Problem: VAGINAL DELIVERY - RECOVERY AND POST PARTUM  Goal: Vital signs are medically acceptable  10/17/2021 2255 by Rodolfo Jaimes RN  Outcome: Met This Shift  10/17/2021 1529 by Isatu Malik RN  Outcome: Met This Shift  Goal: Patient will remain free of falls  10/17/2021 2255 by Rodolfo Jaimes RN  Outcome: Met This Shift  10/17/2021 1529 by Isatu Malik RN  Outcome: Met This Shift  Goal: Fundus firm at midline  10/17/2021 2255 by Rodolfo Jaimes RN  Outcome: Met This Shift  10/17/2021 1529 by Isatu Malik RN  Outcome: Met This Shift  Goal: Moderate rubra without clots, no purulent discharge, no foul smelling lochia  10/17/2021 2255 by Rodolfo Jaimes RN  Outcome: Met This Shift  10/17/2021 1529 by Isatu Malik RN  Outcome: Met This Shift  Goal: Empties bladder  10/17/2021 2255 by Rodolfo Jaimes RN  Outcome: Met This Shift  10/17/2021 1529 by Isatu Malik RN  Outcome: Met This Shift  Goal: Verbalizes understanding of normal bowel function resumption  10/17/2021 2255 by Rodolfo Jaimes RN  Outcome: Met This Shift  10/17/2021 1529 by Isatu Malik RN  Outcome: Met This Shift  Goal: Edema will be absent or minimal  10/17/2021 2255 by Rodolfo Jaimes RN  Outcome: Met This Shift  10/17/2021 1529 by Isatu Malik RN  Outcome: Met This Shift  Goal: Breasts are soft with nipple integrity intact  10/17/2021 2255 by Rodolfo Jaimes RN  Outcome: Met This Shift  10/17/2021 1529 by Isatu Malik RN  Outcome: Met This Shift  Goal: Appropriate behavior observed  10/17/2021 2255 by Rodolfo Jaimes RN  Outcome: Met This Shift  10/17/2021 1529 by Isatu Malik RN  Outcome: Met This Shift  Goal: Positive Mother-Baby interactions are observed  10/17/2021 2255 by Rodolfo Jaimes RN  Outcome: Met This Shift  10/17/2021 1529 by Isatu Malik RN  Outcome: Met This Shift  Goal: Perineum intact without discharge or hematoma  10/17/2021 2255 by Rodolfo Jaimes RN  Outcome: Met This Shift  10/17/2021 1529 by Isatu Malik,

## 2021-10-18 NOTE — PROGRESS NOTES
Progress Note    SUBJECTIVE:   Pt comfortable; +void; +flatus; +ambulation; decreased vaginal bleeding    OBJECTIVE:    VITALS:  /76   Pulse 52   Temp 98.2 °F (36.8 °C) (Oral)   Resp 18   Ht 5' 3\" (1.6 m)   Wt 218 lb (98.9 kg)   LMP 2021   SpO2 97%   Breastfeeding Unknown   BMI 38.62 kg/m²   Physical Exam  ABDOMEN:  normal bowel sounds, non-distended, non-tender and uterus firm  Ext nt    DATA:      ASSESSMENT AND PLAN:  S/p   Active Problems:    40 weeks gestation of pregnancy  Resolved Problems:    * No resolved hospital problems.  *      PPD#2  Plan discharge home today

## 2021-10-18 NOTE — CARE COORDINATION
SW Discharge Planning     SW called [facility] ( [phone]) and spoke with Marcel Chinchilla in intake who [phone][facility] NOT be involved at this time     PLAN    Baby CAN be discharged home when medically ready, children services will NOT be involved at this time.      Electronically signed by CORRINA Ibrahim on 10/18/2021 at 12:55 PM

## 2021-10-18 NOTE — FLOWSHEET NOTE
Pt. states has been on zoloft for 14 weeks and feels good,knows to call  If becomes depressed and states Dr. Lola June aware of history postpartum depression .

## 2021-10-18 NOTE — CARE COORDINATION
SW Discharge Planning  SW received consult for \" positive drug screen\" ( positive UDS on 10/16/21 for THC, no urine noted on baby     SW met with Antoine Ruiz ( [phone]) mother to baby girl Donnell Gaffney ( 10/16/21) and introduced self and role. Elizabeth Gr reported that she resides at the address listed in the chart with baby's father, Andrés Joya and their son, Violet Millard ( 12/2/14). Elizabeth Gr stated that she is currently unemployed and will be adding baby to her [employer]. Per Elizabeth Gr, prenatal care was with Dr. Prachi Bennett and pediatric care will be with Dr. Negrita Garcia. Elizabeth Gr Reported that she has all needed items including a car seat and pack and play. We discussed safe sleep practices. Elizabeth Gr stated that she is currently involved with [facility] and declined a [facility] referral. Elizabeth Gr  denied any past or current history of children services involvement, legal issues, substance abuse aside from [facility], domestic violence or mental health diagnosis. We discussed awareness of Post Partum Depression and encouraged contact with her OB if any problems arise. Elizabeth Gr did report that she is \"set up\" in [place] with counseling services. Elizabeth Gr also expressed understanding for the need of a [employer] ( [phone]) referral due to her THC usage during pregnancy. During assessment Elizabeth Gr was polite and pleasant and was noted to be affectionate and attentive to baby     SW completed [employer] ( [phone]) referral to supervisor Imer Wallis, who was answering the phones for intake.      PLAN    Baby can NOT be discharged home until [facility] ( [phone]) provides disposition  SW to continue communication with nursing staff and [facility] ( [phone])    Electronically signed by CORRINA Infante on 10/18/2021 at 9:21 AM

## 2021-10-21 LAB — CANNABINOIDS CONF, URINE: <15 NG/ML

## 2021-11-02 NOTE — CARE COORDINATION
SW Discharge Planning     SW noted baby's cordstat to be positive for THC Benzoylecgonine and v-pu-Kcthbymvtdiaapo.  [facility] ( [phone][facility] and provided information to , Giovany Tamez     Electronically signed by CORRINA Grande on 11/2/2021 at 3:32 PM

## 2021-12-03 NOTE — DISCHARGE SUMMARY
Obstetric Discharge Summary    Admitting Diagnosis  IUP term weeks  OB History        2    Para   2    Term   2       0    AB        Living   2       SAB   0    IAB   0    Ectopic   0    Molar   0    Multiple   0    Live Births   2                Reasons for Admission on 10/16/2021  7:50 AM  40 weeks gestation of pregnancy [Z3A.40]  No comment available  Onset of Labor  Induction of Labor    Prenatal Procedures  None    Intrapartum Procedures                 Spontaneous Vaginal Delivery: See Labor and Delivery Summary       Postpartum Procedures  None    Postpartum/Operative Complications       [place] Data  Information for the patient's :  Marco Grier Girl Marco A Stanton [[id number]]   female   Birth Weight: 7 lb 1.2 oz (3.21 kg)     Discharge With Mother  Complications: No    Discharge Diagnosis       Discharge Information  Discharge Medication List as of 10/18/2021  9:43 AM      CONTINUE these medications which have NOT CHANGED    Details   Prenatal MV-Min-Fe Fum-FA-DHA (PRENATAL 1 PO) Take by mouthHistorical Med      sertraline (ZOLOFT) 20 MG/ML concentrated solution Take 10 mg by mouth dailyHistorical Med         STOP taking these medications       famotidine (PEPCID) 20 MG tablet Comments:   Reason for Stopping:         folic acid (FOLVITE) 1 MG tablet Comments:   Reason for Stopping:         vitamin B-6 (PYRIDOXINE) 50 MG tablet Comments:   Reason for Stopping:         Doxylamine Succinate, Sleep, (UNISOM PO) Comments:   Reason for Stopping:               No discharge procedures on file.     Discharge to: Home  Follow up in 6 weeks     Comments

## 2023-04-03 ENCOUNTER — HOSPITAL ENCOUNTER (OUTPATIENT)
Dept: HOSPITAL 83 - LAB | Age: 28
Discharge: HOME | End: 2023-04-03
Attending: NURSE PRACTITIONER
Payer: COMMERCIAL

## 2023-04-03 DIAGNOSIS — Z34.82: Primary | ICD-10-CM

## 2023-04-03 DIAGNOSIS — Z3A.00: ICD-10-CM

## 2023-05-26 ENCOUNTER — HOSPITAL ENCOUNTER (OUTPATIENT)
Age: 28
Discharge: HOME | End: 2023-05-26
Attending: OBSTETRICS & GYNECOLOGY | Admitting: OBSTETRICS & GYNECOLOGY
Payer: MEDICAID

## 2023-05-26 VITALS
RESPIRATION RATE: 16 BRPM | DIASTOLIC BLOOD PRESSURE: 70 MMHG | SYSTOLIC BLOOD PRESSURE: 117 MMHG | TEMPERATURE: 98 F | HEART RATE: 71 BPM | BODY MASS INDEX: 37.21 KG/M2 | WEIGHT: 210 LBS | HEIGHT: 63 IN

## 2023-05-26 PROBLEM — Z3A.31 31 WEEKS GESTATION OF PREGNANCY: Status: ACTIVE | Noted: 2023-05-26

## 2023-05-26 PROBLEM — O23.43 UTI (URINARY TRACT INFECTION) DURING PREGNANCY, THIRD TRIMESTER: Status: ACTIVE | Noted: 2023-05-26

## 2023-05-26 PROBLEM — Z91.148 NONCOMPLIANCE W/MEDICATION TREATMENT DUE TO INTERMIT USE OF MEDICATION: Status: ACTIVE | Noted: 2023-05-26

## 2023-05-26 LAB
BACTERIA URNS QL MICRO: ABNORMAL /HPF
BILIRUB UR QL STRIP: NEGATIVE
CLARITY UR: CLEAR
COLOR UR: YELLOW
GLUCOSE UR STRIP-MCNC: NEGATIVE MG/DL
HGB UR QL STRIP: NEGATIVE
KETONES UR STRIP-MCNC: 15 MG/DL
LEUKOCYTE ESTERASE UR QL STRIP: NEGATIVE
NITRITE UR QL STRIP: NEGATIVE
PH UR STRIP: 6.5 [PH] (ref 5–9)
PROT UR STRIP-MCNC: NEGATIVE MG/DL
RBC #/AREA URNS HPF: ABNORMAL /HPF (ref 0–2)
SP GR UR STRIP: 1.02 (ref 1–1.03)
UROBILINOGEN UR STRIP-ACNC: 1 E.U./DL
WBC #/AREA URNS HPF: ABNORMAL /HPF (ref 0–5)

## 2023-05-26 PROCEDURE — 2580000003 HC RX 258: Performed by: OBSTETRICS & GYNECOLOGY

## 2023-05-26 PROCEDURE — 81001 URINALYSIS AUTO W/SCOPE: CPT

## 2023-05-26 PROCEDURE — 51701 INSERT BLADDER CATHETER: CPT

## 2023-05-26 RX ORDER — SODIUM CHLORIDE, SODIUM LACTATE, POTASSIUM CHLORIDE, CALCIUM CHLORIDE 600; 310; 30; 20 MG/100ML; MG/100ML; MG/100ML; MG/100ML
INJECTION, SOLUTION INTRAVENOUS CONTINUOUS
Status: DISCONTINUED | OUTPATIENT
Start: 2023-05-26 | End: 2023-05-26 | Stop reason: HOSPADM

## 2023-05-26 RX ORDER — CALCIUM CARBONATE 200(500)MG
TABLET,CHEWABLE ORAL
Status: DISCONTINUED
Start: 2023-05-26 | End: 2023-05-26 | Stop reason: HOSPADM

## 2023-05-26 RX ORDER — SODIUM CHLORIDE, SODIUM LACTATE, POTASSIUM CHLORIDE, AND CALCIUM CHLORIDE .6; .31; .03; .02 G/100ML; G/100ML; G/100ML; G/100ML
500 INJECTION, SOLUTION INTRAVENOUS ONCE
Status: COMPLETED | OUTPATIENT
Start: 2023-05-26 | End: 2023-05-26

## 2023-05-26 RX ADMIN — SODIUM CHLORIDE, POTASSIUM CHLORIDE, SODIUM LACTATE AND CALCIUM CHLORIDE 500 ML: 600; 310; 30; 20 INJECTION, SOLUTION INTRAVENOUS at 11:54

## 2023-05-26 NOTE — DISCHARGE INSTRUCTIONS
Home Undelivered Discharge Instructions    After Discharge Orders:    Future Appointments   Date Time Provider Yolanda Ochoa   6/6/2023  1:10 PM Diego Ruiz, [address]        Call physician with questions              Diet:  normal diet as tolerated    Rest: normal activity as tolerated    Other instructions: Do kick counts once a day on your baby. Choose the time of day your baby is most active. Get in a comfortable lying or sitting position and time how long it takes to feel 10 kicks, twists, turns, swishes, or rolls.  Call your physician or midwife if there have not been 10 kicks in 2 hours    Call physician or midwife, return to Labor and Delivery, call 911, or go to the nearest Emergency Room if: increased leakage or fluid, contractions more than  6 per  1 hour, decreased fetal movement, persistent low back pain or cramping, bleeding from vaginal area, difficulty urinating, pain with urination, difficulty breathing, new calf pain, persistent headache, or vision change

## 2023-05-26 NOTE — PROGRESS NOTES
Patient arrived to unit with complaints of dizziness and blood in urine. Patient is currently on Keflex for noted UTI. She reports positive fetal movement and denies any LOF. Placed on EFM and house officer notified of arrival. Call light in reach.

## 2023-05-26 NOTE — H&P
Department of Obstetrics and Gynecology  Physician Assistant Obstetrics History and Physical      HISTORY OF PRESENT ILLNESS:      The patient is a 29 y.o.  3 parity 2 at 32 weeks' 5 days' gestation presents to L&D Antepartum from home due to intermittent dizziness and hematuria. Prescribed Keflex on 2023 that was not started until 2023, not taking QID as prescribed, not taken today. Documented ob note told to increase oral fluid intaketo 10 - 12 bottles daily 2023. States she drinks 4 glasses a day even after told to increase fluid intake. Current obstetric history is significant for:  Recent UTI- noncompliance with recommended medical treatment  Matenal obesity: BMI 37.20 kg/m2  Cigarette smoking- 1/2 ppd  Marijuana abuse during pregnancy  O negative- scheduled for Rhogam    Estimated Due Date:  2023  Contractions: No  Leaking of fluid: No  Bleeding:  No  Perceived fetal movement: Good        PAST OB HISTORY:  OB History    Para Term  AB Living   3 2 2 0   2   SAB IAB Ectopic Molar Multiple Live Births   0 0 0 0 0 2      # Outcome Date GA Lbr Tyler/2nd Weight Sex Delivery Anes PTL Lv   3 Current            2 Term 10/16/21 40w6d 02:45 / 00:27 7 lb 1.2 oz (3.21 kg) F Vag-Spont EPI N PAULINA   1 Term 14 38w5d  5 lb 12 oz (2.608 kg) M  EPI N PAULINA      Complications: Oligohydramnios           Pre-eclampsia:  No      :  No      D & C:  No      Cerclage:  No      LEEP:  No      Myomectomy:  No       Labor: No    Past Medical History:  Denies hypertension, diabetes, asthma, cardiac or thyroid disease  Diagnosis Date    Depression     History of blood transfusion     after first labor        Past Surgical History:    Procedure Laterality Date    CHOLECYSTECTOMY          Social History:    Reports that she has been smoking cigarettes. She has been smoking an average of .5 packs per day.  She has never used smokeless tobacco. She reports that she does not

## 2023-05-26 NOTE — PROGRESS NOTES
Dr. Morris Servana updated on cath UA results, 1L of fluid finished, patient sleeping at this time. Discharge orders received.

## 2023-06-13 ENCOUNTER — ANCILLARY PROCEDURE (OUTPATIENT)
Dept: OBGYN CLINIC | Age: 28
End: 2023-06-13
Payer: MEDICAID

## 2023-06-13 PROBLEM — R42 DIZZINESS: Status: ACTIVE | Noted: 2023-06-13

## 2023-06-13 PROBLEM — F17.210 CIGARETTE SMOKER: Status: ACTIVE | Noted: 2023-06-13

## 2023-06-13 PROBLEM — Z82.1 FAMILY HISTORY OF BLINDNESS: Status: ACTIVE | Noted: 2023-06-13

## 2023-06-13 PROCEDURE — 76818 FETAL BIOPHYS PROFILE W/NST: CPT | Performed by: OBSTETRICS & GYNECOLOGY

## 2023-06-13 PROCEDURE — 76805 OB US >/= 14 WKS SNGL FETUS: CPT | Performed by: OBSTETRICS & GYNECOLOGY

## 2023-06-13 PROCEDURE — 76819 FETAL BIOPHYS PROFIL W/O NST: CPT | Performed by: OBSTETRICS & GYNECOLOGY

## 2023-06-13 PROCEDURE — 76820 UMBILICAL ARTERY ECHO: CPT | Performed by: OBSTETRICS & GYNECOLOGY

## 2023-07-07 ENCOUNTER — HOSPITAL ENCOUNTER (EMERGENCY)
Dept: HOSPITAL 83 - ED | Age: 28
Discharge: HOME | End: 2023-07-07
Payer: COMMERCIAL

## 2023-07-07 VITALS — HEIGHT: 55 IN

## 2023-07-07 DIAGNOSIS — O23.43: ICD-10-CM

## 2023-07-07 DIAGNOSIS — Z90.49: ICD-10-CM

## 2023-07-07 DIAGNOSIS — Z3A.38: ICD-10-CM

## 2023-07-07 DIAGNOSIS — Z88.1: ICD-10-CM

## 2023-07-07 DIAGNOSIS — O26.53: Primary | ICD-10-CM

## 2023-07-07 DIAGNOSIS — D72.829: ICD-10-CM

## 2023-07-07 DIAGNOSIS — N39.0: ICD-10-CM

## 2023-07-07 DIAGNOSIS — F17.200: ICD-10-CM

## 2023-07-07 DIAGNOSIS — D64.9: ICD-10-CM

## 2023-07-07 DIAGNOSIS — O25.13: ICD-10-CM

## 2023-07-07 LAB
ALP SERPL-CCNC: 158 U/L (ref 46–116)
ALT SERPL W P-5'-P-CCNC: 18 U/L (ref 10–49)
BACTERIA #/AREA URNS HPF: (no result) /[HPF]
BASOPHILS # BLD AUTO: 0 10*3/UL (ref 0–0.1)
BASOPHILS NFR BLD AUTO: 0.2 % (ref 0–1)
BUN SERPL-MCNC: 9 MG/DL (ref 9–23)
CHLORIDE SERPL-SCNC: 107 MMOL/L (ref 98–107)
EOSINOPHIL # BLD AUTO: 0.1 10*3/UL (ref 0–0.4)
EOSINOPHIL # BLD AUTO: 0.7 % (ref 1–4)
EPI CELLS #/AREA URNS HPF: (no result) /[HPF]
ERYTHROCYTE [DISTWIDTH] IN BLOOD BY AUTOMATED COUNT: 13.5 % (ref 0–14.5)
HCT VFR BLD AUTO: 32.5 % (ref 37–47)
LEUKOCYTE ESTERASE UR QL STRIP: (no result)
LYMPHOCYTES # BLD AUTO: 2.1 10*3/UL (ref 1.3–4.4)
LYMPHOCYTES NFR BLD AUTO: 17.3 % (ref 27–41)
MCH RBC QN AUTO: 29.8 PG (ref 27–31)
MCHC RBC AUTO-ENTMCNC: 34.2 G/DL (ref 33–37)
MCV RBC AUTO: 87.1 FL (ref 81–99)
MONOCYTES # BLD AUTO: 0.6 10*3/UL (ref 0.1–1)
MONOCYTES NFR BLD MANUAL: 5.1 % (ref 3–9)
NEUT #: 9.3 10*3/UL (ref 2.3–7.9)
NEUT %: 76.2 % (ref 47–73)
NRBC BLD QL AUTO: 0 10*3/UL (ref 0–0)
PH UR STRIP: 6.5 [PH] (ref 4.5–8)
PLATELET # BLD AUTO: 200 10*3/UL (ref 130–400)
PMV BLD AUTO: 11.8 FL (ref 9.6–12.3)
POTASSIUM SERPL-SCNC: 3.5 MMOL/L (ref 3.4–5.1)
PROT SERPL-MCNC: 6.5 GM/DL (ref 6–8)
RBC # BLD AUTO: 3.73 10*6/UL (ref 4.1–5.1)
SP GR UR: 1.01 (ref 1–1.03)
UROBILINOGEN UR STRIP-MCNC: 1 E.U./DL (ref 0–1)
WBC #/AREA URNS HPF: (no result) WBC/HPF (ref 0–5)
WBC NRBC COR # BLD AUTO: 12.2 10*3/UL (ref 4.8–10.8)

## 2023-07-16 ENCOUNTER — HOSPITAL ENCOUNTER (INPATIENT)
Age: 28
LOS: 4 days | Discharge: HOME | DRG: 560 | End: 2023-07-20
Attending: OBSTETRICS & GYNECOLOGY | Admitting: STUDENT IN AN ORGANIZED HEALTH CARE EDUCATION/TRAINING PROGRAM
Payer: MEDICAID

## 2023-07-16 DIAGNOSIS — R00.1 BRADYCARDIA: Primary | ICD-10-CM

## 2023-07-16 PROBLEM — Z3A.39 39 WEEKS GESTATION OF PREGNANCY: Status: ACTIVE | Noted: 2023-07-16

## 2023-07-16 LAB
ABO + RH BLD: NORMAL
AMPHET UR QL SCN: NEGATIVE
ARM BAND NUMBER: NORMAL
BARBITURATES UR QL SCN: NEGATIVE
BASOPHILS # BLD: 0.04 K/UL (ref 0–0.2)
BASOPHILS NFR BLD: 0 % (ref 0–2)
BENZODIAZ UR QL: NEGATIVE
BLOOD BANK SAMPLE EXPIRATION: NORMAL
BLOOD GROUP ANTIBODIES SERPL: NEGATIVE
BUPRENORPHINE UR QL: NEGATIVE
CANNABINOIDS UR QL SCN: POSITIVE
COCAINE UR QL SCN: NEGATIVE
EOSINOPHIL # BLD: 0.13 K/UL (ref 0.05–0.5)
EOSINOPHILS RELATIVE PERCENT: 1 % (ref 0–6)
ERYTHROCYTE [DISTWIDTH] IN BLOOD BY AUTOMATED COUNT: 13.8 % (ref 11.5–15)
FENTANYL UR QL: NEGATIVE
HCT VFR BLD AUTO: 36 % (ref 34–48)
HGB BLD-MCNC: 11.8 G/DL (ref 11.5–15.5)
IMM GRANULOCYTES # BLD AUTO: 0.07 K/UL (ref 0–0.58)
IMM GRANULOCYTES NFR BLD: 1 % (ref 0–5)
LYMPHOCYTES # BLD: 23 % (ref 20–42)
LYMPHOCYTES NFR BLD: 3.41 K/UL (ref 1.5–4)
MCH RBC QN AUTO: 28.6 PG (ref 26–35)
MCHC RBC AUTO-ENTMCNC: 32.8 G/DL (ref 32–34.5)
MCV RBC AUTO: 87.4 FL (ref 80–99.9)
METHADONE UR QL: NEGATIVE
MONOCYTES NFR BLD: 0.89 K/UL (ref 0.1–0.95)
MONOCYTES NFR BLD: 6 % (ref 2–12)
NEUTROPHILS NFR BLD: 70 % (ref 43–80)
NEUTS SEG NFR BLD: 10.42 K/UL (ref 1.8–7.3)
OPIATES UR QL SCN: NEGATIVE
OXYCODONE UR QL SCN: NEGATIVE
PCP UR QL SCN: NEGATIVE
PLATELET # BLD AUTO: 211 K/UL (ref 130–450)
PMV BLD AUTO: 12.2 FL (ref 7–12)
RBC # BLD AUTO: 4.12 M/UL (ref 3.5–5.5)
TEST INFORMATION: ABNORMAL
WBC OTHER # BLD: 15 K/UL (ref 4.5–11.5)

## 2023-07-16 PROCEDURE — 99222 1ST HOSP IP/OBS MODERATE 55: CPT | Performed by: STUDENT IN AN ORGANIZED HEALTH CARE EDUCATION/TRAINING PROGRAM

## 2023-07-16 PROCEDURE — 6370000000 HC RX 637 (ALT 250 FOR IP): Performed by: OBSTETRICS & GYNECOLOGY

## 2023-07-16 PROCEDURE — 86850 RBC ANTIBODY SCREEN: CPT

## 2023-07-16 PROCEDURE — G0480 DRUG TEST DEF 1-7 CLASSES: HCPCS

## 2023-07-16 PROCEDURE — 88307 TISSUE EXAM BY PATHOLOGIST: CPT

## 2023-07-16 PROCEDURE — 2580000003 HC RX 258: Performed by: OBSTETRICS & GYNECOLOGY

## 2023-07-16 PROCEDURE — 86901 BLOOD TYPING SEROLOGIC RH(D): CPT

## 2023-07-16 PROCEDURE — 86900 BLOOD TYPING SEROLOGIC ABO: CPT

## 2023-07-16 PROCEDURE — 1220000000 HC SEMI PRIVATE OB R&B

## 2023-07-16 PROCEDURE — 80307 DRUG TEST PRSMV CHEM ANLYZR: CPT

## 2023-07-16 PROCEDURE — 6360000002 HC RX W HCPCS: Performed by: OBSTETRICS & GYNECOLOGY

## 2023-07-16 PROCEDURE — 7200000001 HC VAGINAL DELIVERY

## 2023-07-16 PROCEDURE — 85027 COMPLETE CBC AUTOMATED: CPT

## 2023-07-16 RX ORDER — SODIUM CHLORIDE 0.9 % (FLUSH) 0.9 %
5-40 SYRINGE (ML) INJECTION PRN
Status: DISCONTINUED | OUTPATIENT
Start: 2023-07-16 | End: 2023-07-20 | Stop reason: HOSPADM

## 2023-07-16 RX ORDER — SODIUM CHLORIDE 0.9 % (FLUSH) 0.9 %
5-40 SYRINGE (ML) INJECTION EVERY 12 HOURS SCHEDULED
Status: DISCONTINUED | OUTPATIENT
Start: 2023-07-16 | End: 2023-07-20 | Stop reason: HOSPADM

## 2023-07-16 RX ORDER — MISOPROSTOL 200 UG/1
800 TABLET ORAL PRN
Status: DISCONTINUED | OUTPATIENT
Start: 2023-07-16 | End: 2023-07-16

## 2023-07-16 RX ORDER — ACETAMINOPHEN 650 MG
TABLET, EXTENDED RELEASE ORAL
Status: DISCONTINUED
Start: 2023-07-16 | End: 2023-07-16

## 2023-07-16 RX ORDER — PENICILLIN G POTASSIUM 5000000 [IU]/1
INJECTION, POWDER, FOR SOLUTION INTRAMUSCULAR; INTRAVENOUS
Status: DISCONTINUED
Start: 2023-07-16 | End: 2023-07-16

## 2023-07-16 RX ORDER — SODIUM CHLORIDE 0.9 % (FLUSH) 0.9 %
5-40 SYRINGE (ML) INJECTION PRN
Status: DISCONTINUED | OUTPATIENT
Start: 2023-07-16 | End: 2023-07-16

## 2023-07-16 RX ORDER — SODIUM CHLORIDE 0.9 % (FLUSH) 0.9 %
5-40 SYRINGE (ML) INJECTION EVERY 12 HOURS SCHEDULED
Status: DISCONTINUED | OUTPATIENT
Start: 2023-07-16 | End: 2023-07-16

## 2023-07-16 RX ORDER — SODIUM CHLORIDE, SODIUM LACTATE, POTASSIUM CHLORIDE, AND CALCIUM CHLORIDE .6; .31; .03; .02 G/100ML; G/100ML; G/100ML; G/100ML
500 INJECTION, SOLUTION INTRAVENOUS PRN
Status: DISCONTINUED | OUTPATIENT
Start: 2023-07-16 | End: 2023-07-16

## 2023-07-16 RX ORDER — ONDANSETRON 2 MG/ML
4 INJECTION INTRAMUSCULAR; INTRAVENOUS EVERY 6 HOURS PRN
Status: DISCONTINUED | OUTPATIENT
Start: 2023-07-16 | End: 2023-07-16

## 2023-07-16 RX ORDER — MODIFIED LANOLIN
OINTMENT (GRAM) TOPICAL PRN
Status: DISCONTINUED | OUTPATIENT
Start: 2023-07-16 | End: 2023-07-20 | Stop reason: HOSPADM

## 2023-07-16 RX ORDER — SODIUM CHLORIDE 9 MG/ML
25 INJECTION, SOLUTION INTRAVENOUS PRN
Status: DISCONTINUED | OUTPATIENT
Start: 2023-07-16 | End: 2023-07-16

## 2023-07-16 RX ORDER — DOCUSATE SODIUM 100 MG/1
100 CAPSULE, LIQUID FILLED ORAL 2 TIMES DAILY
Status: DISCONTINUED | OUTPATIENT
Start: 2023-07-16 | End: 2023-07-16

## 2023-07-16 RX ORDER — METHYLERGONOVINE MALEATE 0.2 MG/ML
200 INJECTION INTRAVENOUS PRN
Status: DISCONTINUED | OUTPATIENT
Start: 2023-07-16 | End: 2023-07-16

## 2023-07-16 RX ORDER — LIDOCAINE HYDROCHLORIDE 10 MG/ML
INJECTION, SOLUTION INFILTRATION; PERINEURAL
Status: DISCONTINUED
Start: 2023-07-16 | End: 2023-07-16

## 2023-07-16 RX ORDER — OXYTOCIN 10 [USP'U]/ML
10 INJECTION, SOLUTION INTRAMUSCULAR; INTRAVENOUS ONCE
Status: COMPLETED | OUTPATIENT
Start: 2023-07-16 | End: 2023-07-16

## 2023-07-16 RX ORDER — SODIUM CHLORIDE, SODIUM LACTATE, POTASSIUM CHLORIDE, CALCIUM CHLORIDE 600; 310; 30; 20 MG/100ML; MG/100ML; MG/100ML; MG/100ML
INJECTION, SOLUTION INTRAVENOUS CONTINUOUS
Status: DISCONTINUED | OUTPATIENT
Start: 2023-07-16 | End: 2023-07-19

## 2023-07-16 RX ORDER — DOCUSATE SODIUM 100 MG/1
100 CAPSULE, LIQUID FILLED ORAL 2 TIMES DAILY
Status: DISCONTINUED | OUTPATIENT
Start: 2023-07-16 | End: 2023-07-20 | Stop reason: HOSPADM

## 2023-07-16 RX ORDER — ACETAMINOPHEN 500 MG
1000 TABLET ORAL EVERY 8 HOURS PRN
Status: DISCONTINUED | OUTPATIENT
Start: 2023-07-16 | End: 2023-07-20 | Stop reason: HOSPADM

## 2023-07-16 RX ORDER — CARBOPROST TROMETHAMINE 250 UG/ML
250 INJECTION, SOLUTION INTRAMUSCULAR PRN
Status: DISCONTINUED | OUTPATIENT
Start: 2023-07-16 | End: 2023-07-16

## 2023-07-16 RX ORDER — DEXTROSE MONOHYDRATE 50 MG/ML
INJECTION, SOLUTION INTRAVENOUS
Status: DISCONTINUED
Start: 2023-07-16 | End: 2023-07-16

## 2023-07-16 RX ORDER — SODIUM CHLORIDE, SODIUM LACTATE, POTASSIUM CHLORIDE, CALCIUM CHLORIDE 600; 310; 30; 20 MG/100ML; MG/100ML; MG/100ML; MG/100ML
INJECTION, SOLUTION INTRAVENOUS CONTINUOUS
Status: DISCONTINUED | OUTPATIENT
Start: 2023-07-16 | End: 2023-07-16

## 2023-07-16 RX ORDER — SODIUM CHLORIDE 9 MG/ML
INJECTION, SOLUTION INTRAVENOUS PRN
Status: DISCONTINUED | OUTPATIENT
Start: 2023-07-16 | End: 2023-07-20 | Stop reason: HOSPADM

## 2023-07-16 RX ORDER — SODIUM CHLORIDE, SODIUM LACTATE, POTASSIUM CHLORIDE, AND CALCIUM CHLORIDE .6; .31; .03; .02 G/100ML; G/100ML; G/100ML; G/100ML
1000 INJECTION, SOLUTION INTRAVENOUS PRN
Status: DISCONTINUED | OUTPATIENT
Start: 2023-07-16 | End: 2023-07-16

## 2023-07-16 RX ORDER — IBUPROFEN 600 MG/1
600 TABLET ORAL EVERY 8 HOURS PRN
Status: DISCONTINUED | OUTPATIENT
Start: 2023-07-16 | End: 2023-07-17

## 2023-07-16 RX ADMIN — IBUPROFEN 600 MG: 600 TABLET, FILM COATED ORAL at 20:26

## 2023-07-16 RX ADMIN — Medication 166.7 ML: at 09:00

## 2023-07-16 RX ADMIN — ONDANSETRON 4 MG: 2 INJECTION INTRAMUSCULAR; INTRAVENOUS at 09:30

## 2023-07-16 RX ADMIN — OXYTOCIN 10 UNITS: 10 INJECTION, SOLUTION INTRAMUSCULAR; INTRAVENOUS at 08:55

## 2023-07-16 RX ADMIN — SODIUM CHLORIDE, PRESERVATIVE FREE 10 ML: 5 INJECTION INTRAVENOUS at 12:47

## 2023-07-16 RX ADMIN — DOCUSATE SODIUM 100 MG: 100 CAPSULE, LIQUID FILLED ORAL at 12:45

## 2023-07-16 RX ADMIN — DOCUSATE SODIUM 100 MG: 100 CAPSULE, LIQUID FILLED ORAL at 20:26

## 2023-07-16 RX ADMIN — IBUPROFEN 600 MG: 600 TABLET, FILM COATED ORAL at 12:23

## 2023-07-16 RX ADMIN — ACETAMINOPHEN 1000 MG: 500 TABLET ORAL at 19:05

## 2023-07-16 ASSESSMENT — PAIN DESCRIPTION - DESCRIPTORS
DESCRIPTORS: CRAMPING;DISCOMFORT
DESCRIPTORS: ACHING;DISCOMFORT;SORE

## 2023-07-16 ASSESSMENT — PAIN SCALES - GENERAL
PAINLEVEL_OUTOF10: 7

## 2023-07-16 ASSESSMENT — PAIN - FUNCTIONAL ASSESSMENT
PAIN_FUNCTIONAL_ASSESSMENT: PREVENTS OR INTERFERES SOME ACTIVE ACTIVITIES AND ADLS
PAIN_FUNCTIONAL_ASSESSMENT: ACTIVITIES ARE NOT PREVENTED

## 2023-07-16 ASSESSMENT — PAIN DESCRIPTION - ORIENTATION
ORIENTATION: LOWER
ORIENTATION: LOWER

## 2023-07-16 ASSESSMENT — PAIN DESCRIPTION - LOCATION
LOCATION: ABDOMEN;LEG
LOCATION: ABDOMEN;PERINEUM

## 2023-07-16 NOTE — PROGRESS NOTES
Baby to nursery. Offered patient assistance to go to bathroom and get cleaned up. Pt states she would like to rest and wait a while. States she will call out when ready to get up.

## 2023-07-16 NOTE — H&P
Department of Obstetrics and Gynecology  Labor and Delivery  Attending History and Pysical       Subjective:   Nina Ying is a 29 y.o. female  at 39w0d brought in by squad with contractions     -LOF, -VB, +FM, +Contractions  No x of asthma, HTN. No preeclampsia symptoms of HA, visual changes,  RUQ pain. OB History    Para Term  AB Living   3 2 2 0   2   SAB IAB Ectopic Molar Multiple Live Births   0 0 0 0 0 2      # Outcome Date GA Lbr Tyler/2nd Weight Sex Delivery Anes PTL Lv   3 Current            2 Term 10/16/21 40w6d 02:45 / 00:27 7 lb 1.2 oz (3.21 kg) F Vag-Spont EPI N PAULINA   1 Term 14 38w5d  5 lb 12 oz (2.608 kg) M Vag-Spont EPI N PAULINA      Complications: Oligohydramnios       Review of Systems  Skin: no changes  All other review of systems negative    Past Medical History  Past Medical History:   Diagnosis Date    Depression     History of blood transfusion     after first labor       Past Surgical History  Past Surgical History:   Procedure Laterality Date    CHOLECYSTECTOMY      LAPAROSCOPY         Allergies  Allergies   Allergen Reactions    Ciprofloxacin Hives       Family History: Non contributory    Social History: Denies smoking, alcohol, drugs    Physical Exam:  There were no vitals filed for this visit. CONSTITUTIONAL:  awake, alert, cooperative, no apparent distress  LUNGS:  No increased work of breathing, CTAB  CARDIOVASCULAR:  RRR  ABDOMEN:  no rebound or guarding . EXTREMETIES:  Minimal edema. PELVIC: Normal external genitalia. SVE: 7cm per RN exam    FHRT:  Baseline: normal  Variability: Moderate  Accels: Present  Decels: None     Hearne:     Membranes: Intact       Labs:  No visits with results within 10 Day(s) from this visit.    Latest known visit with results is:   Admission on 2023, Discharged on 2023   Component Date Value Ref Range Status    Color, UA 2023 Yellow  Straw/Yellow Final    Clarity, UA 2023 Clear  Clear Final

## 2023-07-16 NOTE — PROCEDURES
Anastacio Mello is a 29 y.o. female patient. No diagnosis found. Past Medical History:   Diagnosis Date    Depression     History of blood transfusion     after first labor     Last menstrual period 10/16/2022, unknown if currently breastfeeding. Procedures  Patient delivered  a male infant via spontaneous vaginal under no anesthesia over no  episiotomy at 8.51  Weighing 7lbs 11oz Apgars 9-9. Placenta with 3VC. No  lacerations. Shoulder delivered without difficulty. EBL 200cc  Placenta to path.       Monserrat Kent MD  7/16/2023 8:57 AM    Monserrat Kent MD  7/16/2023

## 2023-07-16 NOTE — PROGRESS NOTES
Patient presents to l&d at 39.0 weeks gestation c/o ctx's that started at 0400. Denies lof and vb. +FM.  EFM applied

## 2023-07-16 NOTE — FLOWSHEET NOTE
Patient to mom baby ,oriented to room and unit ,call light with in reach ,  up to void with out assist

## 2023-07-17 PROBLEM — I95.9 HYPOTENSION: Status: ACTIVE | Noted: 2023-07-17

## 2023-07-17 PROBLEM — D72.829 LEUKOCYTOSIS: Status: ACTIVE | Noted: 2023-07-17

## 2023-07-17 LAB
% FETAL BLEED: 0 %
ABNORMAL SPECIMEN VALIDITY TEST: NORMAL
ALBUMIN SERPL-MCNC: 3 G/DL (ref 3.5–5.2)
ALP SERPL-CCNC: 132 U/L (ref 35–104)
ALT SERPL-CCNC: 8 U/L (ref 0–32)
ANION GAP SERPL CALCULATED.3IONS-SCNC: 10 MMOL/L (ref 7–16)
AST SERPL-CCNC: 27 U/L (ref 0–31)
BILIRUB SERPL-MCNC: 0.2 MG/DL (ref 0–1.2)
BUN SERPL-MCNC: 14 MG/DL (ref 6–20)
CALCIUM SERPL-MCNC: 9.2 MG/DL (ref 8.6–10.2)
CHLORIDE SERPL-SCNC: 106 MMOL/L (ref 98–107)
CO2 SERPL-SCNC: 22 MMOL/L (ref 22–29)
COMPONENT: NORMAL
CREAT SERPL-MCNC: 0.9 MG/DL (ref 0.5–1)
ERYTHROCYTE [DISTWIDTH] IN BLOOD BY AUTOMATED COUNT: 13.8 % (ref 11.5–15)
ERYTHROCYTE [DISTWIDTH] IN BLOOD BY AUTOMATED COUNT: 13.9 % (ref 11.5–15)
FETAL BLEED VOLUME: 0 ML
GFR SERPL CREATININE-BSD FRML MDRD: >60 ML/MIN/1.73M2
GLUCOSE SERPL-MCNC: 73 MG/DL (ref 74–99)
HCT VFR BLD AUTO: 28.9 % (ref 34–48)
HCT VFR BLD AUTO: 30.3 % (ref 34–48)
HGB BLD-MCNC: 9.5 G/DL (ref 11.5–15.5)
HGB BLD-MCNC: 9.7 G/DL (ref 11.5–15.5)
INTEGRITY CHECK, CREATININE, URINE: 107.3
INTEGRITY CHECK, OXIDANT, URINE: 35
INTEGRITY CHECK, PH, URINE: 7.4
INTEGRITY CHECK, SPECIFIC GRAVITY, URINE: 1.01
MAGNESIUM SERPL-MCNC: 2 MG/DL (ref 1.6–2.6)
MCH RBC QN AUTO: 28.2 PG (ref 26–35)
MCH RBC QN AUTO: 28.6 PG (ref 26–35)
MCHC RBC AUTO-ENTMCNC: 32 G/DL (ref 32–34.5)
MCHC RBC AUTO-ENTMCNC: 32.9 G/DL (ref 32–34.5)
MCV RBC AUTO: 87 FL (ref 80–99.9)
MCV RBC AUTO: 88.1 FL (ref 80–99.9)
METER GLUCOSE: 81 MG/DL (ref 74–99)
PHOSPHATE SERPL-MCNC: 4 MG/DL (ref 2.5–4.5)
PLATELET # BLD AUTO: 189 K/UL (ref 130–450)
PLATELET # BLD AUTO: 192 K/UL (ref 130–450)
PMV BLD AUTO: 12.2 FL (ref 7–12)
PMV BLD AUTO: 12.4 FL (ref 7–12)
POTASSIUM SERPL-SCNC: 4.4 MMOL/L (ref 3.5–5)
PROT SERPL-MCNC: 5.8 G/DL (ref 6.4–8.3)
RBC # BLD AUTO: 3.32 M/UL (ref 3.5–5.5)
RBC # BLD AUTO: 3.44 M/UL (ref 3.5–5.5)
RHOGAM DOSES REQUIRED: 1
SODIUM SERPL-SCNC: 138 MMOL/L (ref 132–146)
STATUS OF UNITS: NORMAL
THC NORMALIZED, QUANTITIATIVE, URINE: 14.6 NG/ML
THC-COOH, QUANTITATIVE, URINE: 15.7 NG/ML
TRANSFUSION STATUS: NORMAL
UNIT DIVISION: 0
UNIT NUMBER: NORMAL
WBC OTHER # BLD: 11.7 K/UL (ref 4.5–11.5)
WBC OTHER # BLD: 13.3 K/UL (ref 4.5–11.5)

## 2023-07-17 PROCEDURE — 99291 CRITICAL CARE FIRST HOUR: CPT | Performed by: INTERNAL MEDICINE

## 2023-07-17 PROCEDURE — 80053 COMPREHEN METABOLIC PANEL: CPT

## 2023-07-17 PROCEDURE — 82947 ASSAY GLUCOSE BLOOD QUANT: CPT

## 2023-07-17 PROCEDURE — 85461 HEMOGLOBIN FETAL: CPT

## 2023-07-17 PROCEDURE — 6360000002 HC RX W HCPCS: Performed by: OBSTETRICS & GYNECOLOGY

## 2023-07-17 PROCEDURE — 83735 ASSAY OF MAGNESIUM: CPT

## 2023-07-17 PROCEDURE — 1220000000 HC SEMI PRIVATE OB R&B

## 2023-07-17 PROCEDURE — 99254 IP/OBS CNSLTJ NEW/EST MOD 60: CPT | Performed by: STUDENT IN AN ORGANIZED HEALTH CARE EDUCATION/TRAINING PROGRAM

## 2023-07-17 PROCEDURE — 84100 ASSAY OF PHOSPHORUS: CPT

## 2023-07-17 PROCEDURE — 2580000003 HC RX 258: Performed by: STUDENT IN AN ORGANIZED HEALTH CARE EDUCATION/TRAINING PROGRAM

## 2023-07-17 PROCEDURE — 85027 COMPLETE CBC AUTOMATED: CPT

## 2023-07-17 PROCEDURE — 93005 ELECTROCARDIOGRAM TRACING: CPT | Performed by: OBSTETRICS & GYNECOLOGY

## 2023-07-17 PROCEDURE — 85460 HEMOGLOBIN FETAL: CPT

## 2023-07-17 PROCEDURE — 6370000000 HC RX 637 (ALT 250 FOR IP): Performed by: OBSTETRICS & GYNECOLOGY

## 2023-07-17 RX ORDER — CALCIUM CARBONATE 500 MG/1
500 TABLET, CHEWABLE ORAL 3 TIMES DAILY PRN
Status: DISCONTINUED | OUTPATIENT
Start: 2023-07-17 | End: 2023-07-19

## 2023-07-17 RX ORDER — SODIUM CHLORIDE, SODIUM LACTATE, POTASSIUM CHLORIDE, CALCIUM CHLORIDE 600; 310; 30; 20 MG/100ML; MG/100ML; MG/100ML; MG/100ML
INJECTION, SOLUTION INTRAVENOUS ONCE
Status: DISCONTINUED | OUTPATIENT
Start: 2023-07-17 | End: 2023-07-19

## 2023-07-17 RX ORDER — 0.9 % SODIUM CHLORIDE 0.9 %
1000 INTRAVENOUS SOLUTION INTRAVENOUS ONCE
Status: COMPLETED | OUTPATIENT
Start: 2023-07-17 | End: 2023-07-17

## 2023-07-17 RX ORDER — FAMOTIDINE 20 MG/1
20 TABLET, FILM COATED ORAL 2 TIMES DAILY
Status: DISCONTINUED | OUTPATIENT
Start: 2023-07-17 | End: 2023-07-20 | Stop reason: HOSPADM

## 2023-07-17 RX ADMIN — DOCUSATE SODIUM 100 MG: 100 CAPSULE, LIQUID FILLED ORAL at 09:19

## 2023-07-17 RX ADMIN — HUMAN RHO(D) IMMUNE GLOBULIN 300 MCG: 300 INJECTION, SOLUTION INTRAMUSCULAR at 16:15

## 2023-07-17 RX ADMIN — SODIUM CHLORIDE 1000 ML: 9 INJECTION, SOLUTION INTRAVENOUS at 21:55

## 2023-07-17 RX ADMIN — IBUPROFEN 600 MG: 600 TABLET, FILM COATED ORAL at 06:30

## 2023-07-17 RX ADMIN — ACETAMINOPHEN 1000 MG: 500 TABLET ORAL at 19:22

## 2023-07-17 RX ADMIN — ACETAMINOPHEN 1000 MG: 500 TABLET ORAL at 09:19

## 2023-07-17 RX ADMIN — FAMOTIDINE 20 MG: 20 TABLET ORAL at 19:22

## 2023-07-17 RX ADMIN — CALCIUM CARBONATE 500 MG: 500 TABLET, CHEWABLE ORAL at 10:55

## 2023-07-17 ASSESSMENT — PAIN SCALES - GENERAL
PAINLEVEL_OUTOF10: 5
PAINLEVEL_OUTOF10: 4
PAINLEVEL_OUTOF10: 5
PAINLEVEL_OUTOF10: 3
PAINLEVEL_OUTOF10: 5

## 2023-07-17 ASSESSMENT — PAIN DESCRIPTION - LOCATION
LOCATION: ABDOMEN
LOCATION: ABDOMEN;PERINEUM

## 2023-07-17 ASSESSMENT — PAIN DESCRIPTION - DESCRIPTORS
DESCRIPTORS: ACHING;CRAMPING;DISCOMFORT
DESCRIPTORS: SORE

## 2023-07-17 ASSESSMENT — PAIN DESCRIPTION - ORIENTATION
ORIENTATION: UPPER
ORIENTATION: LOWER

## 2023-07-17 ASSESSMENT — PAIN - FUNCTIONAL ASSESSMENT: PAIN_FUNCTIONAL_ASSESSMENT: ACTIVITIES ARE NOT PREVENTED

## 2023-07-17 NOTE — CARE COORDINATION
SW Discharge Planning   SW received consult for \" positive MJ during pregnancy\" (  positive UDS for THC on 7/16/23; baby negative UDS 7/17/23    SW met with Jerrica Calabrese ( [phone]) mother to baby boy Vianca Rubi ( 7/16/23) and introduced self and role. Leo Bruno reported that she resides at the address listed in the chart with father of the baby, Manuel Quarles and her children, Evgeny Logan ( 12/2/14) and Omar Donis ( 10/16/21). Leo Bruno stated that she is currently unemployed and will be adding baby to her [facility]. Per Leo Bruno, prenatal care was with Dr. Ashli Reveles and pediatric care will be with Dr. Maye Bruno. Leo Bruno Reported that she has all needed items including a car seat and pack and play. Leo Bruno reported t hat she is already involved with [facility] and WIC. Leo Bruno  denied any past or current history of  legal issues, domestic violence or mental health diagnosis. We discussed awareness of Post Partum Depression and encouraged contact with her OB if any problems arise. Leo Bruno did report that [employer] ( [phone]) is currently open with her Kristofer Britt ) due to previous substance abuse.  Leo Bruno reported that she did smoke THC throughout pregnancy, however declined to discuss any of her other substance abuse history with Therese Adams expressed understanding for the need of a [employer] ( [phone]) referral. SW completed [facility] ( [phone]) referral to , Emiliano Turner can NOT be discharged home until [facility] ( [phone]) provides disposition  SW to continue communication with nursing staff and [facility] ( [phone])     Electronically signed by CORRINA Stone on 7/17/2023 at 10:06 AM

## 2023-07-17 NOTE — FLOWSHEET NOTE
Patient educated to not get up to bathroom until feeling better. Patient could use bedpan when needed.

## 2023-07-17 NOTE — FLOWSHEET NOTE
While in room giving Rhogam (1615)  patient mentioned that she has been really hot/dizzy/pain to upper mid abdomen under rib cage the last 15-30 minutes or so (these symptoms were prior to rhogam shot). Patient currently in bed holding . Patient awake and alert talking to nurse. Patient reports she has had heartburn during pregnancy and this AM. She also reports dizziness during pregnancy she was in hospital for and patient reports they said she was dehydrated. Fundus Firm and rubra small amount. Patient denies any clots. Pt has been up and voiding well. Lungs clear. Vital signs taken see flow sheet. Dr. Olsen [place] here on unit at the time and house officer called to come see patient.

## 2023-07-17 NOTE — FLOWSHEET NOTE
Bedside report with with Dick Adler RN. Patient had an emesis in basin of approx 200 cc. Patient reports she feels a little better afterwards. Updated patient that I got her an ordered for Pepcid due to heartburn she has been experiencing. Patient aware to call for bathroom assistance.      in nursery at this time for patient to rest.

## 2023-07-17 NOTE — CARE COORDINATION
SW Discharge Planning   KWAME called [facility] ( [phone]) and spoke with [facility] in intake, who reported that [facility] ( [phone]) will NOT be involved at this time    PLAN    Baby CAN be discharged home when medically ready, children services will NOT be involved at this time.      Electronically signed by CORRINA Bustos on 7/17/2023 at 1:52 PM

## 2023-07-17 NOTE — FLOWSHEET NOTE
Patient reports she walked to bathroom with SO because she wasn't dizzy at the time. Instructed to call nurse for bathroom assistance.

## 2023-07-17 NOTE — FLOWSHEET NOTE
Dr Kim Guardado office called and EKG was reviewed and to have House Dr call them if any other needs or changes.

## 2023-07-17 NOTE — FLOWSHEET NOTE
RRT called per dr Nargis Pillai and vitals. See flowsheet vitals. Patient awake and alert. Complaints of dizziness and upper mid sternum (under rib pain). States she feels like she may pass out (patient never loss consciousness). Nurse at bedside entire time.

## 2023-07-18 ENCOUNTER — APPOINTMENT (OUTPATIENT)
Dept: GENERAL RADIOLOGY | Age: 28
DRG: 560 | End: 2023-07-18
Payer: MEDICAID

## 2023-07-18 ENCOUNTER — APPOINTMENT (OUTPATIENT)
Dept: CT IMAGING | Age: 28
DRG: 560 | End: 2023-07-18
Payer: MEDICAID

## 2023-07-18 PROBLEM — R07.9 CHEST PAIN IN ADULT: Status: ACTIVE | Noted: 2023-07-18

## 2023-07-18 PROBLEM — R00.1 BRADYCARDIA: Status: ACTIVE | Noted: 2023-07-18

## 2023-07-18 LAB
ANION GAP SERPL CALCULATED.3IONS-SCNC: 12 MMOL/L (ref 7–16)
ANION GAP SERPL CALCULATED.3IONS-SCNC: 8 MMOL/L (ref 7–16)
BUN SERPL-MCNC: 7 MG/DL (ref 6–20)
BUN SERPL-MCNC: 8 MG/DL (ref 6–20)
CALCIUM SERPL-MCNC: 8.6 MG/DL (ref 8.6–10.2)
CALCIUM SERPL-MCNC: 8.7 MG/DL (ref 8.6–10.2)
CHLORIDE SERPL-SCNC: 108 MMOL/L (ref 98–107)
CHLORIDE SERPL-SCNC: 109 MMOL/L (ref 98–107)
CO2 SERPL-SCNC: 22 MMOL/L (ref 22–29)
CO2 SERPL-SCNC: 23 MMOL/L (ref 22–29)
COMPONENT: NORMAL
CORTIS SERPL-MCNC: 19.5 UG/DL (ref 2.7–18.4)
CREAT SERPL-MCNC: 0.7 MG/DL (ref 0.5–1)
CREAT SERPL-MCNC: 0.8 MG/DL (ref 0.5–1)
D DIMER: 528 NG/ML DDU (ref 0–232)
EKG ATRIAL RATE: 34 BPM
EKG ATRIAL RATE: 47 BPM
EKG ATRIAL RATE: 52 BPM
EKG P AXIS: 30 DEGREES
EKG P AXIS: 33 DEGREES
EKG P AXIS: 51 DEGREES
EKG P-R INTERVAL: 120 MS
EKG P-R INTERVAL: 130 MS
EKG P-R INTERVAL: 148 MS
EKG Q-T INTERVAL: 442 MS
EKG Q-T INTERVAL: 456 MS
EKG Q-T INTERVAL: 520 MS
EKG QRS DURATION: 80 MS
EKG QRS DURATION: 80 MS
EKG QRS DURATION: 82 MS
EKG QTC CALCULATION (BAZETT): 390 MS
EKG QTC CALCULATION (BAZETT): 391 MS
EKG QTC CALCULATION (BAZETT): 424 MS
EKG R AXIS: 33 DEGREES
EKG R AXIS: 42 DEGREES
EKG R AXIS: 63 DEGREES
EKG T AXIS: 27 DEGREES
EKG T AXIS: 29 DEGREES
EKG T AXIS: 45 DEGREES
EKG VENTRICULAR RATE: 34 BPM
EKG VENTRICULAR RATE: 47 BPM
EKG VENTRICULAR RATE: 52 BPM
GFR SERPL CREATININE-BSD FRML MDRD: >60 ML/MIN/1.73M2
GFR SERPL CREATININE-BSD FRML MDRD: >60 ML/MIN/1.73M2
GLUCOSE SERPL-MCNC: 116 MG/DL (ref 74–99)
GLUCOSE SERPL-MCNC: 81 MG/DL (ref 74–99)
HCT VFR BLD AUTO: 28 % (ref 34–48)
HGB BLD-MCNC: 8.9 G/DL (ref 11.5–15.5)
LACTATE BLDV-SCNC: 1.6 MMOL/L (ref 0.5–2.2)
LV EF: 63 %
LVEF MODALITY: NORMAL
MAGNESIUM SERPL-MCNC: 2.1 MG/DL (ref 1.6–2.6)
MAGNESIUM SERPL-MCNC: 2.2 MG/DL (ref 1.6–2.6)
METER GLUCOSE: 129 MG/DL (ref 74–99)
METER GLUCOSE: 73 MG/DL (ref 74–99)
METER GLUCOSE: 76 MG/DL (ref 74–99)
METER GLUCOSE: 90 MG/DL (ref 74–99)
POTASSIUM SERPL-SCNC: 4 MMOL/L (ref 3.5–5)
POTASSIUM SERPL-SCNC: 4.1 MMOL/L (ref 3.5–5)
PROCALCITONIN SERPL-MCNC: 0.1 NG/ML (ref 0–0.08)
SODIUM SERPL-SCNC: 140 MMOL/L (ref 132–146)
SODIUM SERPL-SCNC: 142 MMOL/L (ref 132–146)
STATUS OF UNITS: NORMAL
T4 FREE SERPL-MCNC: 0.9 NG/DL (ref 0.9–1.7)
TRANSFUSION STATUS: NORMAL
TROPONIN I SERPL HS-MCNC: <6 NG/L (ref 0–9)
TSH SERPL DL<=0.05 MIU/L-ACNC: 3.76 UIU/ML (ref 0.27–4.2)
UNIT DIVISION: 0
UNIT NUMBER: NORMAL

## 2023-07-18 PROCEDURE — 6360000004 HC RX CONTRAST MEDICATION: Performed by: RADIOLOGY

## 2023-07-18 PROCEDURE — 6370000000 HC RX 637 (ALT 250 FOR IP): Performed by: OBSTETRICS & GYNECOLOGY

## 2023-07-18 PROCEDURE — APPSS60 APP SPLIT SHARED TIME 46-60 MINUTES: Performed by: CLINICAL NURSE SPECIALIST

## 2023-07-18 PROCEDURE — 93010 ELECTROCARDIOGRAM REPORT: CPT | Performed by: INTERNAL MEDICINE

## 2023-07-18 PROCEDURE — 83735 ASSAY OF MAGNESIUM: CPT

## 2023-07-18 PROCEDURE — 2580000003 HC RX 258: Performed by: OBSTETRICS & GYNECOLOGY

## 2023-07-18 PROCEDURE — 74018 RADEX ABDOMEN 1 VIEW: CPT

## 2023-07-18 PROCEDURE — 71275 CT ANGIOGRAPHY CHEST: CPT

## 2023-07-18 PROCEDURE — 84145 PROCALCITONIN (PCT): CPT

## 2023-07-18 PROCEDURE — 84439 ASSAY OF FREE THYROXINE: CPT

## 2023-07-18 PROCEDURE — 80048 BASIC METABOLIC PNL TOTAL CA: CPT

## 2023-07-18 PROCEDURE — 85014 HEMATOCRIT: CPT

## 2023-07-18 PROCEDURE — 84443 ASSAY THYROID STIM HORMONE: CPT

## 2023-07-18 PROCEDURE — 6360000002 HC RX W HCPCS

## 2023-07-18 PROCEDURE — 85379 FIBRIN DEGRADATION QUANT: CPT

## 2023-07-18 PROCEDURE — 87081 CULTURE SCREEN ONLY: CPT

## 2023-07-18 PROCEDURE — 82533 TOTAL CORTISOL: CPT

## 2023-07-18 PROCEDURE — 84484 ASSAY OF TROPONIN QUANT: CPT

## 2023-07-18 PROCEDURE — 83605 ASSAY OF LACTIC ACID: CPT

## 2023-07-18 PROCEDURE — 2580000003 HC RX 258: Performed by: STUDENT IN AN ORGANIZED HEALTH CARE EDUCATION/TRAINING PROGRAM

## 2023-07-18 PROCEDURE — 99254 IP/OBS CNSLTJ NEW/EST MOD 60: CPT | Performed by: INTERNAL MEDICINE

## 2023-07-18 PROCEDURE — 85018 HEMOGLOBIN: CPT

## 2023-07-18 PROCEDURE — 93306 TTE W/DOPPLER COMPLETE: CPT

## 2023-07-18 PROCEDURE — 93005 ELECTROCARDIOGRAM TRACING: CPT | Performed by: INTERNAL MEDICINE

## 2023-07-18 PROCEDURE — 6370000000 HC RX 637 (ALT 250 FOR IP)

## 2023-07-18 PROCEDURE — 99233 SBSQ HOSP IP/OBS HIGH 50: CPT | Performed by: INTERNAL MEDICINE

## 2023-07-18 PROCEDURE — 93005 ELECTROCARDIOGRAM TRACING: CPT | Performed by: STUDENT IN AN ORGANIZED HEALTH CARE EDUCATION/TRAINING PROGRAM

## 2023-07-18 PROCEDURE — 74174 CTA ABD&PLVS W/CONTRAST: CPT

## 2023-07-18 PROCEDURE — 82947 ASSAY GLUCOSE BLOOD QUANT: CPT

## 2023-07-18 PROCEDURE — 2000000000 HC ICU R&B

## 2023-07-18 RX ORDER — DOPAMINE HYDROCHLORIDE 320 MG/100ML
INJECTION, SOLUTION INTRAVENOUS
Status: DISCONTINUED
Start: 2023-07-18 | End: 2023-07-18 | Stop reason: WASHOUT

## 2023-07-18 RX ORDER — DOPAMINE HYDROCHLORIDE 320 MG/100ML
1-20 INJECTION, SOLUTION INTRAVENOUS CONTINUOUS
Status: DISCONTINUED | OUTPATIENT
Start: 2023-07-18 | End: 2023-07-19

## 2023-07-18 RX ORDER — ATROPINE SULFATE 0.1 MG/ML
INJECTION INTRAVENOUS
Status: DISCONTINUED
Start: 2023-07-18 | End: 2023-07-18 | Stop reason: WASHOUT

## 2023-07-18 RX ORDER — SIMETHICONE 80 MG
80 TABLET,CHEWABLE ORAL 4 TIMES DAILY PRN
Status: DISCONTINUED | OUTPATIENT
Start: 2023-07-18 | End: 2023-07-20 | Stop reason: HOSPADM

## 2023-07-18 RX ORDER — DEXTROSE MONOHYDRATE 100 MG/ML
INJECTION, SOLUTION INTRAVENOUS CONTINUOUS PRN
Status: DISCONTINUED | OUTPATIENT
Start: 2023-07-18 | End: 2023-07-19

## 2023-07-18 RX ORDER — POLYETHYLENE GLYCOL 3350 17 G/17G
17 POWDER, FOR SOLUTION ORAL DAILY
Status: DISCONTINUED | OUTPATIENT
Start: 2023-07-18 | End: 2023-07-20 | Stop reason: HOSPADM

## 2023-07-18 RX ORDER — 0.9 % SODIUM CHLORIDE 0.9 %
1000 INTRAVENOUS SOLUTION INTRAVENOUS ONCE
Status: COMPLETED | OUTPATIENT
Start: 2023-07-18 | End: 2023-07-18

## 2023-07-18 RX ADMIN — DOCUSATE SODIUM 100 MG: 100 CAPSULE, LIQUID FILLED ORAL at 19:58

## 2023-07-18 RX ADMIN — SODIUM CHLORIDE, PRESERVATIVE FREE 10 ML: 5 INJECTION INTRAVENOUS at 19:59

## 2023-07-18 RX ADMIN — DOPAMINE HYDROCHLORIDE 5 MCG/KG/MIN: 320 INJECTION, SOLUTION INTRAVENOUS at 13:11

## 2023-07-18 RX ADMIN — FAMOTIDINE 20 MG: 20 TABLET ORAL at 08:32

## 2023-07-18 RX ADMIN — SODIUM CHLORIDE 1000 ML: 9 INJECTION, SOLUTION INTRAVENOUS at 01:51

## 2023-07-18 RX ADMIN — IOPAMIDOL 75 ML: 755 INJECTION, SOLUTION INTRAVENOUS at 12:24

## 2023-07-18 RX ADMIN — ACETAMINOPHEN 1000 MG: 500 TABLET ORAL at 16:48

## 2023-07-18 RX ADMIN — FAMOTIDINE 20 MG: 20 TABLET ORAL at 19:58

## 2023-07-18 RX ADMIN — ACETAMINOPHEN 1000 MG: 500 TABLET ORAL at 08:32

## 2023-07-18 RX ADMIN — SODIUM CHLORIDE, PRESERVATIVE FREE 10 ML: 5 INJECTION INTRAVENOUS at 08:36

## 2023-07-18 RX ADMIN — DOCUSATE SODIUM 100 MG: 100 CAPSULE, LIQUID FILLED ORAL at 08:32

## 2023-07-18 RX ADMIN — POLYETHYLENE GLYCOL 3350 17 G: 17 POWDER, FOR SOLUTION ORAL at 16:23

## 2023-07-18 ASSESSMENT — PAIN DESCRIPTION - ORIENTATION
ORIENTATION: UPPER

## 2023-07-18 ASSESSMENT — ENCOUNTER SYMPTOMS
RHINORRHEA: 0
COUGH: 0
SHORTNESS OF BREATH: 0
DIARRHEA: 0
CONSTIPATION: 1
SORE THROAT: 0
NAUSEA: 1
ABDOMINAL PAIN: 1
PHOTOPHOBIA: 0
VOMITING: 0
CHEST TIGHTNESS: 0
BACK PAIN: 0

## 2023-07-18 ASSESSMENT — PAIN SCALES - GENERAL
PAINLEVEL_OUTOF10: 4
PAINLEVEL_OUTOF10: 10
PAINLEVEL_OUTOF10: 10

## 2023-07-18 ASSESSMENT — PAIN DESCRIPTION - LOCATION
LOCATION: ABDOMEN

## 2023-07-18 ASSESSMENT — PAIN DESCRIPTION - DESCRIPTORS
DESCRIPTORS: SORE
DESCRIPTORS: STABBING
DESCRIPTORS: STABBING

## 2023-07-18 NOTE — PROGRESS NOTES
Patient called nurse into the room to use the bathroom. When patient stood up she became dizzy and light headed. Vitals taken and patient  put back in bed voided on bedpan about 150ml. Vitals taken  pulse in 40s continious pulse ox  hooked up . Oxygen level 97 percent room air. Epigastic pain noted. Pain when taking a deep breath.

## 2023-07-18 NOTE — PROGRESS NOTES
Called to pt room, pt states has same discomfort as before, upper mid epigastric pain radiating to back, bp 86/40. Pulse 44, color pale, pulse ox 99%. IV of normal saline started . Dr Emma Calderon called to room.

## 2023-07-18 NOTE — PROGRESS NOTES
Dr Ruby Cuevas at bedside, evaluating patient, order received for stat CT scan abd and pelvis and KUB.

## 2023-07-18 NOTE — CARE COORDINATION
SW Discharge Planning     SW was informed by nursing staff Luma Damon) that mother will be going to ICU due to complications. Luma Damon reported that patient's mother-in-law, Gil Ariza  told her that the father of the baby is a drug addict. SW did speak with Val Cat, who reported that she would like baby to be discharged home to her mother in law, Gil Ariza. Due to her already having an open case with Brook Meckel ( Not involved due to [facility] usage) SW called [employer] ( [phone]) and was able to speak with Fabi Tlelez who reported that baby being discharged home to [facility] is appropriate and he will reach out to the family to assist with any other needs. Val Cat signed release of  documentation, providing permission for baby to be discharged home to Gil Ariza. Saco Nap will need to sign release of  documentation at discharge. Staff will need to obtain two copies of ID from markel and all will need to be placed in baby's chart prior to discharge. Release of  document was placed in baby's chart awaiting markel's signature. PLAN    Baby can be discharged home to Gil Ariza peer both mother permission and Catherine ( [phone]) , Brook Meckel. Guera Nap will need to sign release of  documentation at discharge. Staff will need to obtain two copies of ID from markel and all will need to be placed in baby's chart prior to discharge. Release of  document was placed in baby's chart awaiting markel's signature.      Electronically signed by CORRINA Leavitt on 2023 at 12:44 PM

## 2023-07-18 NOTE — PROGRESS NOTES
Patient bolus started. Patient complaining of pain at  IV site. IV removed. Floor nurse and ICU nurse attempted IV. Nursing supervisor started  new IV left hand.

## 2023-07-18 NOTE — PROGRESS NOTES
4 Eyes Skin Assessment     NAME:  Taryn Hanna  YOB: 1995  MEDICAL RECORD NUMBER:  [id number]    The patient is being assessed for  Admission    I agree that at least one RN has performed a thorough Head to Toe Skin Assessment on the patient. ALL assessment sites listed below have been assessed. Areas assessed by both nurses:    Head, Face, Ears, Shoulders, Back, Chest, Arms, Elbows, Hands, Sacrum. Buttock, Coccyx, Ischium, and Legs. Feet and Heels        Does the Patient have a Wound?  No noted wound(s)       Ulises Prevention initiated by RN: No  Wound Care Orders initiated by RN: No    Pressure Injury (Stage 3,4, Unstageable, DTI, NWPT, and Complex wounds) if present, place Wound referral order by RN under : No    New Ostomies, if present place, Ostomy referral order under : No     Nurse 1 eSignature: Electronically signed by Teena Ashby RN on 7/18/23 at 5:04 PM EDT    **SHARE this note so that the co-signing nurse can place an eSignature**    Nurse 2 eSignature: Electronically signed by David Ravi RN on 7/20/23 at 2:20 PM EDT

## 2023-07-18 NOTE — CONSULTS
[facility]) Hospitalist Group Consult Note      CHIEF COMPLAINT: Hypotension, bradycardia    History of Present Illness:    Ms. Terresa Dakins is a 80-year-old female with past medical history of depression, tobacco abuse, marijuana abuse who presents at 39 weeks for normal spontaneous vaginal delivery of infant. In talking with Ms. Olvera, she presented at 39 weeks for normal spontaneous vaginal delivery of infant. On postdelivery day 1 she developed hypotension and bradycardia in the afternoon. A rapid response was called and patient was given IV fluids, 1 L at 125 cc/h. She continued to have hypotension into the 80s and lower 90s with bradycardia into the 50s. Internal medicine was then asked to consult. Regarding her past medical history, it is only significant for depression. She currently does not take any medications regularly. Her other medical history includes tobacco abuse and marijuana abuse. Upon ROS, the patient denies fevers, chills, lightheadedness, dizziness, loss consciousness, seizure, vision change, chest pain, shortness of breath, nausea, vomiting, abdominal pain, constipation, diarrhea, dysuria, increased urinary frequency, myalgias, joint pain, bleeding, new skin rash. Informant(s) for H&P: Patient    REVIEW OF SYSTEMS:  A comprehensive review of systems was negative except for: what is in the HPI      PMH:  Past Medical History:   Diagnosis Date    Depression     History of blood transfusion     after first labor       Surgical History:  Past Surgical History:   Procedure Laterality Date    CHOLECYSTECTOMY      LAPAROSCOPY         Medications Prior to Admission:    Prior to Admission medications    Medication Sig Start Date End Date Taking?  Authorizing Provider   Prenatal Vit-Fe Fumarate-FA (GNP PRENATAL) 28-0.8 MG TABS TAKE 1 TABLET BY MOUTH ONCE A DAY 5/15/23   Historical Provider, MD       Allergies:    Ciprofloxacin    Social History:    reports that she has been to hypovolemia-give 1 L bolus over 1 hour, BP improved to 943 systolic. Favor hypovolemia as cause of hypotension. Continue to trend hemodynamics, can give additional boluses if needed for hypotension. Check lactic acid and D-dimer. 3  29-year-old female at 44 weeks gestation s/p normal spontaneous vaginal delivery of infant  3. Depression-currently not taking any medications  4. Tobacco abuse- cessation  5. Marijuana abuse- cessation    Code Status: Full code  DVT prophylaxis: SCDs      NOTE: This report was transcribed using voice recognition software. Every effort was made to ensure accuracy; however, inadvertent computerized transcription errors may be present.   Electronically signed by Leafy Lefort, MD on 7/17/2023 at 11:22 PM

## 2023-07-18 NOTE — CONSULTS
Received order for consult to read EKG. Spoke to charge nurse on unit. Full consult is not requested - only need EKG read. Instructed nursing to transmit EKGs from machine. Dr. Paz Grace will read EKG. If any further requests or full consult is needed, please call [facility] again.      7/18/2023  7:10 AM    Josef Moses, IAIN - CNS

## 2023-07-18 NOTE — PROGRESS NOTES
Patient admitted from 314 to 18, with the following belongings cell phone, , flip flops, 2 outfits for baby and hat, shirt , placed on monitor, patient oriented to room and unit visiting hours. Patient guide at bedside, reviewed patient rights and responsibilities. MRSA nasal swab obtained. Bed alarm on. Call light within reach.

## 2023-07-18 NOTE — PROGRESS NOTES
called and notified of vitals signs. Patient complaining of epigastric pain. Patient is no longer dizzy .  Orders taken

## 2023-07-18 NOTE — H&P
Critical Care Admit/Consult Note         Patient Harriet Kent   MRN -  [id number]   Acct # - [de-identified]   - 1995      Date of Admission -  2023  7:22 AM  Date of evaluation -  2023  [address] Day - 2          ADMIT/CONSULT DETAILS     Reason for Admit/Consult   Symptomatic bradycardia post partem     Consulting Service/Physician   Consulting - Jude Mina MD  Primary Care Physician - No primary care provider on file. HPI   The patient is a 29 y.o. female with significant past medical history of depression, tobacco use, and marijuana abuse presents to the ICU for symptomatic bradycardia. She delivered with a spontaneous vaginal delivery 2 days ago with no complications. She had abdominal pain and a heart rate in the 30's and 3 second pauses. She had a CTA which ruled out dissection/aneurysm. No intraabdominal pathology was found. Patient was started on a dopamine drip on arrival to the ICU.           Past Medical History         Diagnosis Date    Depression     History of blood transfusion     after first labor        Past Surgical History           Procedure Laterality Date    CHOLECYSTECTOMY      LAPAROSCOPY         Current Medications   Current Medications    polyethylene glycol  17 g Oral Daily    famotidine  20 mg Oral BID    sodium chloride flush  5-40 mL IntraVENous 2 times per day    docusate sodium  100 mg Oral BID    measles, mumps & rubella vaccine  0.5 mL SubCUTAneous Prior to discharge    Tdap-Dtap  0.5 mL IntraMUSCular Prior to discharge     dextrose bolus **OR** dextrose bolus, glucagon (rDNA), dextrose, Glucose, perflutren lipid microspheres, calcium carbonate, sodium chloride flush, sodium chloride, lanolin, benzocaine-menthol, acetaminophen  IV Drips/Infusions   dextrose      DOPamine 4 mcg/kg/min (23 1334)    lactated ringers IV soln      lactated ringers IV soln 125 mL/hr at 23 1027    sodium chloride       Home Medications  Medications

## 2023-07-18 NOTE — CARE COORDINATION
SW Discharge Planning     Joanna Gupta presented to the unit, and signed release of  documentation and provided a copy of her drivers license.  Baby CAN be discharged home to Joanna Gupta       Electronically signed by CORRINA Gill on 2023 at 3:00 PM

## 2023-07-19 ENCOUNTER — APPOINTMENT (OUTPATIENT)
Dept: ULTRASOUND IMAGING | Age: 28
DRG: 560 | End: 2023-07-19
Payer: MEDICAID

## 2023-07-19 ENCOUNTER — APPOINTMENT (OUTPATIENT)
Dept: GENERAL RADIOLOGY | Age: 28
DRG: 560 | End: 2023-07-19
Payer: MEDICAID

## 2023-07-19 LAB
ALBUMIN SERPL-MCNC: 3.6 G/DL (ref 3.5–5.2)
ALP SERPL-CCNC: 422 U/L (ref 35–104)
ALT SERPL-CCNC: 237 U/L (ref 0–32)
ANION GAP SERPL CALCULATED.3IONS-SCNC: 15 MMOL/L (ref 7–16)
AST SERPL-CCNC: 523 U/L (ref 0–31)
BASOPHILS # BLD: 0.05 K/UL (ref 0–0.2)
BASOPHILS NFR BLD: 1 % (ref 0–2)
BILIRUB SERPL-MCNC: 1.9 MG/DL (ref 0–1.2)
BUN SERPL-MCNC: 7 MG/DL (ref 6–20)
CALCIUM SERPL-MCNC: 8.9 MG/DL (ref 8.6–10.2)
CHLORIDE SERPL-SCNC: 104 MMOL/L (ref 98–107)
CO2 SERPL-SCNC: 21 MMOL/L (ref 22–29)
CREAT SERPL-MCNC: 0.7 MG/DL (ref 0.5–1)
EOSINOPHIL # BLD: 0.31 K/UL (ref 0.05–0.5)
EOSINOPHILS RELATIVE PERCENT: 3 % (ref 0–6)
ERYTHROCYTE [DISTWIDTH] IN BLOOD BY AUTOMATED COUNT: 13.7 % (ref 11.5–15)
GFR SERPL CREATININE-BSD FRML MDRD: >60 ML/MIN/1.73M2
GLUCOSE SERPL-MCNC: 98 MG/DL (ref 74–99)
HCT VFR BLD AUTO: 33.7 % (ref 34–48)
HGB BLD-MCNC: 11.1 G/DL (ref 11.5–15.5)
IMM GRANULOCYTES # BLD AUTO: 0.11 K/UL (ref 0–0.58)
IMM GRANULOCYTES NFR BLD: 1 % (ref 0–5)
LIPASE SERPL-CCNC: 30 U/L (ref 13–60)
LYMPHOCYTES # BLD: 22 % (ref 20–42)
LYMPHOCYTES NFR BLD: 2.19 K/UL (ref 1.5–4)
MAGNESIUM SERPL-MCNC: 2.2 MG/DL (ref 1.6–2.6)
MCH RBC QN AUTO: 29 PG (ref 26–35)
MCHC RBC AUTO-ENTMCNC: 32.9 G/DL (ref 32–34.5)
MCV RBC AUTO: 88 FL (ref 80–99.9)
METER GLUCOSE: 98 MG/DL (ref 74–99)
MONOCYTES NFR BLD: 0.65 K/UL (ref 0.1–0.95)
MONOCYTES NFR BLD: 7 % (ref 2–12)
NEUTROPHILS NFR BLD: 66 % (ref 43–80)
NEUTS SEG NFR BLD: 6.51 K/UL (ref 1.8–7.3)
PHOSPHATE SERPL-MCNC: 3.4 MG/DL (ref 2.5–4.5)
PLATELET # BLD AUTO: 222 K/UL (ref 130–450)
PMV BLD AUTO: 11.8 FL (ref 7–12)
POTASSIUM SERPL-SCNC: 3.9 MMOL/L (ref 3.5–5)
PROT SERPL-MCNC: 6.7 G/DL (ref 6.4–8.3)
RBC # BLD AUTO: 3.83 M/UL (ref 3.5–5.5)
SODIUM SERPL-SCNC: 140 MMOL/L (ref 132–146)
SURGICAL PATHOLOGY REPORT: NORMAL
WBC OTHER # BLD: 9.8 K/UL (ref 4.5–11.5)

## 2023-07-19 PROCEDURE — 99233 SBSQ HOSP IP/OBS HIGH 50: CPT | Performed by: INTERNAL MEDICINE

## 2023-07-19 PROCEDURE — 80053 COMPREHEN METABOLIC PANEL: CPT

## 2023-07-19 PROCEDURE — 84100 ASSAY OF PHOSPHORUS: CPT

## 2023-07-19 PROCEDURE — 6370000000 HC RX 637 (ALT 250 FOR IP): Performed by: OBSTETRICS & GYNECOLOGY

## 2023-07-19 PROCEDURE — 82947 ASSAY GLUCOSE BLOOD QUANT: CPT

## 2023-07-19 PROCEDURE — 6370000000 HC RX 637 (ALT 250 FOR IP): Performed by: INTERNAL MEDICINE

## 2023-07-19 PROCEDURE — 83735 ASSAY OF MAGNESIUM: CPT

## 2023-07-19 PROCEDURE — 85027 COMPLETE CBC AUTOMATED: CPT

## 2023-07-19 PROCEDURE — 6360000002 HC RX W HCPCS: Performed by: INTERNAL MEDICINE

## 2023-07-19 PROCEDURE — 2000000000 HC ICU R&B

## 2023-07-19 PROCEDURE — 99232 SBSQ HOSP IP/OBS MODERATE 35: CPT | Performed by: INTERNAL MEDICINE

## 2023-07-19 PROCEDURE — 2580000003 HC RX 258: Performed by: OBSTETRICS & GYNECOLOGY

## 2023-07-19 PROCEDURE — 76705 ECHO EXAM OF ABDOMEN: CPT

## 2023-07-19 PROCEDURE — 6370000000 HC RX 637 (ALT 250 FOR IP)

## 2023-07-19 PROCEDURE — 74018 RADEX ABDOMEN 1 VIEW: CPT

## 2023-07-19 PROCEDURE — 83690 ASSAY OF LIPASE: CPT

## 2023-07-19 RX ORDER — SENNOSIDES A AND B 8.6 MG/1
1 TABLET, FILM COATED ORAL 2 TIMES DAILY
Status: DISCONTINUED | OUTPATIENT
Start: 2023-07-19 | End: 2023-07-20 | Stop reason: HOSPADM

## 2023-07-19 RX ORDER — DOPAMINE HYDROCHLORIDE 320 MG/100ML
1-20 INJECTION, SOLUTION INTRAVENOUS CONTINUOUS
Status: DISCONTINUED | OUTPATIENT
Start: 2023-07-19 | End: 2023-07-20

## 2023-07-19 RX ORDER — BISACODYL 10 MG
10 SUPPOSITORY, RECTAL RECTAL DAILY PRN
Status: DISCONTINUED | OUTPATIENT
Start: 2023-07-19 | End: 2023-07-20 | Stop reason: HOSPADM

## 2023-07-19 RX ORDER — LACTULOSE 10 G/15ML
20 SOLUTION ORAL 3 TIMES DAILY
Status: DISCONTINUED | OUTPATIENT
Start: 2023-07-19 | End: 2023-07-20 | Stop reason: HOSPADM

## 2023-07-19 RX ADMIN — DOPAMINE HYDROCHLORIDE 5 MCG/KG/MIN: 320 INJECTION, SOLUTION INTRAVENOUS at 11:01

## 2023-07-19 RX ADMIN — SENNOSIDES 8.6 MG: 8.6 TABLET, COATED ORAL at 21:05

## 2023-07-19 RX ADMIN — FAMOTIDINE 20 MG: 20 TABLET ORAL at 21:05

## 2023-07-19 RX ADMIN — DOCUSATE SODIUM 100 MG: 100 CAPSULE, LIQUID FILLED ORAL at 07:59

## 2023-07-19 RX ADMIN — FAMOTIDINE 20 MG: 20 TABLET ORAL at 07:59

## 2023-07-19 RX ADMIN — POLYETHYLENE GLYCOL 3350 17 G: 17 POWDER, FOR SOLUTION ORAL at 07:59

## 2023-07-19 RX ADMIN — BISACODYL 10 MG: 10 SUPPOSITORY RECTAL at 10:58

## 2023-07-19 RX ADMIN — SODIUM CHLORIDE, PRESERVATIVE FREE 10 ML: 5 INJECTION INTRAVENOUS at 08:20

## 2023-07-19 RX ADMIN — LACTULOSE 20 G: 20 SOLUTION ORAL at 21:05

## 2023-07-19 RX ADMIN — LACTULOSE 20 G: 20 SOLUTION ORAL at 16:44

## 2023-07-19 RX ADMIN — SODIUM CHLORIDE, PRESERVATIVE FREE 10 ML: 5 INJECTION INTRAVENOUS at 21:00

## 2023-07-19 RX ADMIN — DOCUSATE SODIUM 100 MG: 100 CAPSULE, LIQUID FILLED ORAL at 21:04

## 2023-07-19 RX ADMIN — SENNOSIDES 8.6 MG: 8.6 TABLET, COATED ORAL at 11:03

## 2023-07-19 RX ADMIN — ACETAMINOPHEN 1000 MG: 500 TABLET ORAL at 21:05

## 2023-07-19 ASSESSMENT — PAIN DESCRIPTION - ORIENTATION
ORIENTATION: UPPER
ORIENTATION: UPPER

## 2023-07-19 ASSESSMENT — PAIN SCALES - GENERAL
PAINLEVEL_OUTOF10: 2
PAINLEVEL_OUTOF10: 0
PAINLEVEL_OUTOF10: 2
PAINLEVEL_OUTOF10: 5
PAINLEVEL_OUTOF10: 0
PAINLEVEL_OUTOF10: 3
PAINLEVEL_OUTOF10: 6
PAINLEVEL_OUTOF10: 8

## 2023-07-19 ASSESSMENT — PAIN DESCRIPTION - LOCATION
LOCATION: OTHER (COMMENT)
LOCATION: ABDOMEN
LOCATION: HEAD
LOCATION: ABDOMEN

## 2023-07-19 ASSESSMENT — PAIN DESCRIPTION - DESCRIPTORS
DESCRIPTORS: SHARP;STABBING
DESCRIPTORS: CRUSHING;STABBING
DESCRIPTORS: ACHING

## 2023-07-19 NOTE — PROGRESS NOTES
Patient had 650ml of undigested food vomited up and complaining of epigastric pain. Warm compress given to patient. Patient states it feels like she has gas but cannot move it. This nurse offered her PRN simethicone, patient declined at this time. This nurse gave her apple juice and prune juice mix to help. Increase Dopamine to 5mcg/kg/min due to low BP of 71/48 MAP 56.

## 2023-07-19 NOTE — PROGRESS NOTES
Inpatient Cardiology Consultation      Reason for Consult:  symptomatic bradycardia with hypotension     Consulting Physician: Dr. Skye Dejesus    Requesting Physician:  Dr. Jamison Alejo     Date of Consultation: 7/19/2023    HISTORY OF PRESENT ILLNESS:   Ms. Dami Hallman is a 29 yr old female, not previously known to [facility]iology. Interim:  Still remains on dopamine drip  Reports abdominal pain and significant constipation      Past Medical History:    Marijuana use / tobacco abuse  Depression   G 3  L 3 - LMP 10/16/22- delivered vaginally 7/16/23 39 weeks  -  no lacerations or episiotomy     Past Surgical History:    Past Surgical History:   Procedure Laterality Date    CHOLECYSTECTOMY      LAPAROSCOPY         Medications Prior to admit:  Prior to Admission medications    Medication Sig Start Date End Date Taking?  Authorizing Provider   Prenatal Vit-Fe Fumarate-FA (GNP PRENATAL) 28-0.8 MG TABS TAKE 1 TABLET BY MOUTH ONCE A DAY 5/15/23   Historical Provider, MD       Current Medications:    Current Facility-Administered Medications: senna (SENOKOT) tablet 8.6 mg, 1 tablet, Oral, BID  bisacodyl (DULCOLAX) suppository 10 mg, 10 mg, Rectal, Daily PRN  DOPamine (INTROPIN) 800 mg in dextrose 5 % 250 mL infusion, 1-20 mcg/kg/min, IntraVENous, Continuous  lactulose (CHRONULAC) 10 GM/15ML solution 20 g, 20 g, Oral, TID  perflutren lipid microspheres (DEFINITY) injection 1.5 mL, 1.5 mL, IntraVENous, ONCE PRN  polyethylene glycol (GLYCOLAX) packet 17 g, 17 g, Oral, Daily  simethicone (MYLICON) chewable tablet 80 mg, 80 mg, Oral, 4x Daily PRN  famotidine (PEPCID) tablet 20 mg, 20 mg, Oral, BID  sodium chloride flush 0.9 % injection 5-40 mL, 5-40 mL, IntraVENous, 2 times per day  sodium chloride flush 0.9 % injection 5-40 mL, 5-40 mL, IntraVENous, PRN  0.9 % sodium chloride infusion, , IntraVENous, PRN  docusate sodium (COLACE) capsule 100 mg, 100 mg, Oral, BID  lansinoh lanolin ointment, , Topical,

## 2023-07-19 NOTE — PROGRESS NOTES
Helped patient to bathroom. Attempted to pass stool. Patient became dizzy while sitting on the toilet. In a lot of pain. Unable to move bowels. X-ray tech, Farheen Jean and Jax Resendez helped to get patient back to bed. Became dizzy, legs and feet tingling, lost balance but we were able to get her safely to bed.

## 2023-07-19 NOTE — PATIENT CARE CONFERENCE
Intensive Care Daily Quality Rounding Checklist      ICU Team Members: Dr. Alban Gosselin, Resident Dr. hCristiano Barros, Charge nurse, Bedside nurse, Respiratory therapist    ICU Day #: NUMBER: 2    Intubation Date: N/A    Ventilator Day #: N/A    Central Line Insertion Date: N/A        Day #: N/A        Indication: N/A     Arterial Line Insertion Date: N/A      Day #: N/A    Temporary Hemodialysis Catheter Insertion Date: N/A      Day # N/A    DVT Prophylaxis: moves freely    GI Prophylaxis: Pepcid    Judd Catheter Insertion Date:  N/A       Day #: N/A      Indications: N/A      Continued need (if yes, reason documented and discussed with physician): N/A    Skin Issues/ Wounds and ordered treatment discussed on rounds:     Goals/ Plans for the Day: Daily labs, wean Dopamine as able, bowel regimen

## 2023-07-20 VITALS
HEIGHT: 63 IN | BODY MASS INDEX: 35.7 KG/M2 | TEMPERATURE: 97.8 F | SYSTOLIC BLOOD PRESSURE: 102 MMHG | WEIGHT: 201.5 LBS | DIASTOLIC BLOOD PRESSURE: 63 MMHG | HEART RATE: 70 BPM | OXYGEN SATURATION: 99 % | RESPIRATION RATE: 22 BRPM

## 2023-07-20 LAB
ALBUMIN SERPL-MCNC: 3.4 G/DL (ref 3.5–5.2)
ALP SERPL-CCNC: 508 U/L (ref 35–104)
ALT SERPL-CCNC: 298 U/L (ref 0–32)
ANION GAP SERPL CALCULATED.3IONS-SCNC: 12 MMOL/L (ref 7–16)
AST SERPL-CCNC: 413 U/L (ref 0–31)
BASOPHILS # BLD: 0.07 K/UL (ref 0–0.2)
BASOPHILS NFR BLD: 1 % (ref 0–2)
BILIRUB SERPL-MCNC: 0.6 MG/DL (ref 0–1.2)
BUN SERPL-MCNC: 13 MG/DL (ref 6–20)
CALCIUM SERPL-MCNC: 9.2 MG/DL (ref 8.6–10.2)
CHLORIDE SERPL-SCNC: 106 MMOL/L (ref 98–107)
CO2 SERPL-SCNC: 21 MMOL/L (ref 22–29)
CREAT SERPL-MCNC: 0.7 MG/DL (ref 0.5–1)
EOSINOPHIL # BLD: 0.48 K/UL (ref 0.05–0.5)
EOSINOPHILS RELATIVE PERCENT: 4 % (ref 0–6)
ERYTHROCYTE [DISTWIDTH] IN BLOOD BY AUTOMATED COUNT: 13.8 % (ref 11.5–15)
GFR SERPL CREATININE-BSD FRML MDRD: >60 ML/MIN/1.73M2
GLUCOSE SERPL-MCNC: 92 MG/DL (ref 74–99)
HCT VFR BLD AUTO: 34.8 % (ref 34–48)
HGB BLD-MCNC: 11.6 G/DL (ref 11.5–15.5)
IMM GRANULOCYTES # BLD AUTO: 0.07 K/UL (ref 0–0.58)
IMM GRANULOCYTES NFR BLD: 1 % (ref 0–5)
LYMPHOCYTES NFR BLD: 3.61 K/UL (ref 1.5–4)
LYMPHOCYTES RELATIVE PERCENT: 29 % (ref 20–42)
MAGNESIUM SERPL-MCNC: 2.4 MG/DL (ref 1.6–2.6)
MCH RBC QN AUTO: 28.9 PG (ref 26–35)
MCHC RBC AUTO-ENTMCNC: 33.3 G/DL (ref 32–34.5)
MCV RBC AUTO: 86.8 FL (ref 80–99.9)
MICROORGANISM SPEC CULT: NORMAL
MONOCYTES NFR BLD: 0.77 K/UL (ref 0.1–0.95)
MONOCYTES NFR BLD: 6 % (ref 2–12)
NEUTROPHILS NFR BLD: 60 % (ref 43–80)
NEUTS SEG NFR BLD: 7.68 K/UL (ref 1.8–7.3)
PHOSPHATE SERPL-MCNC: 4.4 MG/DL (ref 2.5–4.5)
PLATELET # BLD AUTO: 303 K/UL (ref 130–450)
PMV BLD AUTO: 11.6 FL (ref 7–12)
POTASSIUM SERPL-SCNC: 3.9 MMOL/L (ref 3.5–5)
PROT SERPL-MCNC: 6.8 G/DL (ref 6.4–8.3)
RBC # BLD AUTO: 4.01 M/UL (ref 3.5–5.5)
SODIUM SERPL-SCNC: 139 MMOL/L (ref 132–146)
SPECIMEN DESCRIPTION: NORMAL
WBC OTHER # BLD: 12.7 K/UL (ref 4.5–11.5)

## 2023-07-20 PROCEDURE — 2580000003 HC RX 258: Performed by: OBSTETRICS & GYNECOLOGY

## 2023-07-20 PROCEDURE — 80053 COMPREHEN METABOLIC PANEL: CPT

## 2023-07-20 PROCEDURE — 6370000000 HC RX 637 (ALT 250 FOR IP)

## 2023-07-20 PROCEDURE — 99232 SBSQ HOSP IP/OBS MODERATE 35: CPT | Performed by: INTERNAL MEDICINE

## 2023-07-20 PROCEDURE — 99233 SBSQ HOSP IP/OBS HIGH 50: CPT | Performed by: INTERNAL MEDICINE

## 2023-07-20 PROCEDURE — 6370000000 HC RX 637 (ALT 250 FOR IP): Performed by: OBSTETRICS & GYNECOLOGY

## 2023-07-20 PROCEDURE — 83735 ASSAY OF MAGNESIUM: CPT

## 2023-07-20 PROCEDURE — 85027 COMPLETE CBC AUTOMATED: CPT

## 2023-07-20 PROCEDURE — 84100 ASSAY OF PHOSPHORUS: CPT

## 2023-07-20 PROCEDURE — 6370000000 HC RX 637 (ALT 250 FOR IP): Performed by: INTERNAL MEDICINE

## 2023-07-20 RX ADMIN — SODIUM CHLORIDE, PRESERVATIVE FREE 10 ML: 5 INJECTION INTRAVENOUS at 08:53

## 2023-07-20 RX ADMIN — DOCUSATE SODIUM 100 MG: 100 CAPSULE, LIQUID FILLED ORAL at 08:53

## 2023-07-20 RX ADMIN — POLYETHYLENE GLYCOL 3350 17 G: 17 POWDER, FOR SOLUTION ORAL at 08:53

## 2023-07-20 RX ADMIN — FAMOTIDINE 20 MG: 20 TABLET ORAL at 08:53

## 2023-07-20 RX ADMIN — SENNOSIDES 8.6 MG: 8.6 TABLET, COATED ORAL at 08:53

## 2023-07-20 RX ADMIN — LACTULOSE 20 G: 20 SOLUTION ORAL at 08:53

## 2023-07-20 ASSESSMENT — PAIN SCALES - GENERAL
PAINLEVEL_OUTOF10: 0

## 2023-07-20 NOTE — PROGRESS NOTES
08:03 Pt. Called me into the room her IV was leaking. IV removed. Pt. A&O x4. Denies any complaints at this time. VSS remains bradycardic on dopamine at 3mcg/kg/min. Assessment complete as documented. Minimal vaginal bleeding noted. No clots per pt. Pt. Ate 100% of her breakfast. Pt. Pleasant and cooperative without complaints.

## 2023-07-20 NOTE — PLAN OF CARE
Problem: Postpartum  Goal: Incisions, wounds, or drain sites healing without S/S of infection  Outcome: Progressing     Problem: Pain  Goal: Verbalizes/displays adequate comfort level or baseline comfort level  Outcome: Progressing     Problem: Infection - Adult  Goal: Absence of infection during hospitalization  Outcome: Progressing  Goal: Absence of fever/infection during anticipated neutropenic period  Outcome: Progressing     Problem: Safety - Adult  Goal: Free from fall injury  Outcome: Progressing     Problem: Discharge Planning  Goal: Discharge to home or other facility with appropriate resources  Outcome: Progressing

## 2023-07-20 NOTE — PROGRESS NOTES
Obtained orders to discharge pt. To home from all consults and primary. IV removed. Pt. Getting dressed. OB Nurse contacted regarding discharge instructions for new Mom and she will be down to facilitate post partum instructions. I reviewed DC instructions per ICU and Cardiology. Pt. Harmony Pears that she is to follow up with cardiology tomorrow to schedule appointment to wear a heart monitor. OB nurse here giving discharge instructions Pt. Verbalized understanding.

## 2023-07-20 NOTE — DISCHARGE INSTRUCTIONS
Follow up with your OB doctor, Dr. Nieves Jain,  in 1 week for a  delivery or in 6 weeks for a vaginal delivery unless otherwise instructed, call the office for appointment. .  For breastfeeding support, you can contact our lactation specialists at [phone] or   [phone]. DIET  Eat a well balanced diet focusing on foods high in fiber and protein  Drink plenty of fluids especially water. To avoid constipation you may take a mild stool softener as recommended by your doctor or midwife. ACTIVITY  Gradually increase your activity. Resume exercise regimen only after advised by your doctor or midwife. Avoid lifting anything heavier than your baby or a gallon of milk until OK'd by your physician or midlife. Avoid driving until your doctor or midwife has given their approval.  More [place] slowly from a lying to sitting and then a standing position. Climb stairs one at a time. Use caution when carrying your baby up and down the stairs. No sexual activity for 6 weeks or until advised by your doctor - Nothing in vagina: intercourse, tampons, or douching. Be prepared to discuss family planning at your follow-up OB visit. You may feel tired or have a lack of energy. You may continue your prenatal vitamin to replenish nutrients post delivery. Nap when infant naps to catch up on sleep. May return to work or school in 6 weeks or as directed by physician or midlife. Take pain medication as prescribed whenever you need them  Take care of yourself by sleeping/resting as much as possible. Let someone else care for you, your infant and housework as much as possible for a while. EMOTIONS  You may feed shah, sad, teary, & overwhelmed. If infant will not stop crying, contact another adult for help or place infant in their crib on their back and take a break. NEVER shake your infant.     Call your doctor if you have unrelieved feelings of: if it does, delete it from your diet. If it does not affect the baby, go ahead and eat it. Avoid caffeine at bedtime. (chocolate has a lot of caffeine) if the baby is sensitive to it. For non-breastfeeding moms:  You may apply ice packs to your breasts over your bra for twenty minutes at a time for comfort. Avoid stimulation to your breasts, when showering allow the water to strike your back not your breasts. Wear a good fitting bra until your milk dries, such as a sports bra. If your breasts are very sore, buy a cabbage and wet some of the inner leaves and place in your bra over your breasts. Your breasts may feel warm when your milk comes in. This is normal.    INCISIONAL CARE / JENNY CARE  Clean your incision in the shower with mild soap. After shower pat the incision area dry and leave open to air. If used, Steri-stipes should fall off in shower by 2 weeks. If used, Haze Jetty should be removed by the OB in office by 1 week. If used/ordered, an abdominal binder may provide support for your incision. Use the jenny-bottle after toileting until bleeding stops. It promotes healing and prevents infection. You are prone to urinary tract infections after a delivery ( c section or vaginal delivery). Drink a lot of fluids. Take a shower instead of a tub bath until bleeding stops. Cleanse your perineum from front to back  If used, stitches or internal clips will dissolve in 4-6 weeks. You may use a sitz bath or soak in a clean tub as needed for comfort. Kegel exercises will help restore bladder control. You may use cool compress on incision site. SWELLING   It takes about 2 weeks to get rid of all of the extra fluid you have from having a baby. Your feet and hands may swell up more than they are now. Keep your legs elevated when sitting or lying. When wearing stocking or socks, make sure they are not too tight. WHEN TO CALL THE DOCTOR  If you have a temp of 100.4 or more.    Your abdomen is

## 2023-07-20 NOTE — PATIENT CARE CONFERENCE
Intensive Care Daily Quality Rounding Checklist      ICU Team Members: Dr. Yary Esquivel, charge nurse, bedside nurse, respiratory therapist    ICU Day #: NUMBER: 3    Intubation Date: n/a    Ventilator Day #: n/a    Central Line Insertion Date: n/a         Day #: n/a        Indication: n/a     Arterial Line Insertion Date: n/a       Day #: n/a    Temporary Hemodialysis Catheter Insertion Date:  n/a      Day # n/a    DVT Prophylaxis: ambulatory     GI Prophylaxis: pepcid     Judd Catheter Insertion Date: n/a        Day #: n/a      Indications: n/a      Continued need (if yes, reason documented and discussed with physician): n/a    Skin Issues/ Wounds and ordered treatment discussed on rounds: no issues     Goals/ Plans for the Day: Daily labs, continue bowel regimen, Dopamine stopped earlier-HR staying above 50 bpm, likely out of ICU later today

## 2023-07-20 NOTE — CARE COORDINATION
Transition of Care-Anticipate discharge in the next 20343 hrs, weaned from Dopamine gtt, return of normal bowel function. Discharge plan is home.     Helena CASTRON, RN  [facility]

## 2023-07-20 NOTE — PROGRESS NOTES
Nutrition Assessment    Type and Reason for Visit:  Initial (ICU/LOS)    Nutrition Recommendations/Plan:   Continue current diet, as tolerated  Continue inpatient monitoring     Nutrition Assessment:    Pt S/P vaginal delivery 7/16. Transferred to ICU due to hypotension and dizziness. On dopamine for bradycardia. Pt on Regular diet with 100% intake at breakfast today but intake had been <25%. Pt had been uncomfortable with constipation - Bowel regimen started and may promote improved appetite w/regular BM. Will continue inpatient monitoring    Nutrition Related Findings:    A&Ox4, constipation, soft round abd +BS, +I/O 2.5L, no edema Wound Type: None (no episiotomy)       Current Nutrition Intake & Therapies:    Average Meal Intake: % (this AM - prior <50% intake)  Average Supplements Intake: None Ordered  ADULT DIET; Regular    Anthropometric Measures:  Height: 5' 3\" (160 cm)  Ideal Body Weight (IBW): 115 lbs (52 kg)    Admission Body Weight: 201 lb 8 oz (91.4 kg) (bedscale 7/18)  Current Body Weight: 201 lb 8 oz (91.4 kg), 175.2 % IBW.  Weight Source: Bed Scale (7/19)  Current BMI (kg/m2): 35.7  Usual Body Weight:  (CHARI - all available actual wt hx during pregnancies)                       BMI Categories:  (N/A d/t pregnancy)        Nutrition Interventions:   Food and/or Nutrient Delivery: Continue Current Diet             Goals:     Goals: PO intake 75% or greater, by next RD assessment       Nutrition Monitoring and Evaluation:   Behavioral-Environmental Outcomes: None Identified  Food/Nutrient Intake Outcomes: Food and Nutrient Intake  Physical Signs/Symptoms Outcomes: Biochemical Data, GI Status, Fluid Status or Edema, Nutrition Focused Physical Findings, Skin, Weight, Hemodynamic Status    Discharge Planning:    No discharge needs at this time     Irlanda Allen RD, CNSC, LD  Contact: x [phone]

## 2023-07-21 NOTE — PROGRESS NOTES
2.4     Phos:   Recent Labs     23  0500 23  0505   PHOS 3.4 4.4     TSH:   Recent Labs     23  1109   TSH 3.76     HgA1c: No results found for: LABA1C  No results found for: EAG  BNP: No results for input(s): BNP in the last 72 hours. PT/INR: No results for input(s): PROTIME, INR in the last 72 hours. APTT:No results for input(s): APTT in the last 72 hours. CARDIAC ENZYMES:  Lab Results   Component Value Date/Time    TROPHS <6 2023 11:09 AM     FASTING LIPID PANEL:No results found for: CHOL, HDL, LDLDIRECT, LDLCALC, TRIG  LIVER PROFILE:  Recent Labs     23  0500 23  0505   * 413*   * 298*   LABALBU 3.6 3.4*         Transthoracic echocardiogram 2023   Left Ventricle   Ejection fraction is visually estimated at 60 to 65%. Left ventricular diastolic filling is elevated . Assessment  Bradycardia   Marijuana use / tobacco abuse  Depression   G 3  L 3 - LMP 10/16/22- delivered vaginally 23 39 weeks  -  no lacerations or episiotomy   Tobacco abuse    Plan:   Report feeling better and wish to be discharged today  She has been weaned off dopamine drip and blood pressure and heart rate are stable  She will  Zio patch heart monitor in the outpatient for further evaluation management  Avoid AV lionel blocking agent  Outpatient sleep apnea with your PCP  Avoid marijuana abuse  Smoking cessation recommended  Patient and   advised to avoid smoking and marijuana use especially around her  and they verbalized understand  Cardiology sign off.   Please call with questions                  Tera Hart MD  [facility]) Cardiology

## 2023-08-27 ENCOUNTER — HOSPITAL ENCOUNTER (EMERGENCY)
Dept: HOSPITAL 83 - ED | Age: 28
Discharge: HOME | End: 2023-08-27
Payer: COMMERCIAL

## 2023-08-27 VITALS — WEIGHT: 193 LBS | BODY MASS INDEX: 35.51 KG/M2 | HEIGHT: 61.97 IN

## 2023-08-27 DIAGNOSIS — R00.1: ICD-10-CM

## 2023-08-27 DIAGNOSIS — Z88.1: ICD-10-CM

## 2023-08-27 DIAGNOSIS — F17.200: ICD-10-CM

## 2023-08-27 DIAGNOSIS — Z90.49: ICD-10-CM

## 2023-08-27 DIAGNOSIS — K29.70: Primary | ICD-10-CM

## 2023-08-27 DIAGNOSIS — R11.2: ICD-10-CM

## 2023-08-27 LAB
ALP SERPL-CCNC: 162 U/L (ref 46–116)
ALT SERPL W P-5'-P-CCNC: 130 U/L (ref 10–49)
BASOPHILS # BLD AUTO: 0 10*3/UL (ref 0–0.1)
BASOPHILS NFR BLD AUTO: 0.3 % (ref 0–1)
BUN SERPL-MCNC: 12 MG/DL (ref 9–23)
CHLORIDE SERPL-SCNC: 111 MMOL/L (ref 98–107)
EOSINOPHIL # BLD AUTO: 0.1 10*3/UL (ref 0–0.4)
EOSINOPHIL # BLD AUTO: 1.1 % (ref 1–4)
ERYTHROCYTE [DISTWIDTH] IN BLOOD BY AUTOMATED COUNT: 13.7 % (ref 0–14.5)
HCT VFR BLD AUTO: 37.9 % (ref 37–47)
LIPASE SERPL-CCNC: 32 U/L (ref 12–53)
LYMPHOCYTES # BLD AUTO: 1.8 10*3/UL (ref 1.3–4.4)
LYMPHOCYTES NFR BLD AUTO: 18.2 % (ref 27–41)
MCH RBC QN AUTO: 27.1 PG (ref 27–31)
MCHC RBC AUTO-ENTMCNC: 30.3 G/DL (ref 33–37)
MCV RBC AUTO: 89.2 FL (ref 81–99)
MONOCYTES # BLD AUTO: 0.5 10*3/UL (ref 0.1–1)
MONOCYTES NFR BLD MANUAL: 5.4 % (ref 3–9)
NEUT #: 7.5 10*3/UL (ref 2.3–7.9)
NEUT %: 74.8 % (ref 47–73)
NRBC BLD QL AUTO: 0 % (ref 0–0)
PLATELET # BLD AUTO: 199 10*3/UL (ref 130–400)
PMV BLD AUTO: 11.7 FL (ref 9.6–12.3)
POTASSIUM SERPL-SCNC: 4.3 MMOL/L (ref 3.4–5.1)
PROT SERPL-MCNC: 6.4 GM/DL (ref 6–8)
RBC # BLD AUTO: 4.25 10*6/UL (ref 4.1–5.1)
WBC NRBC COR # BLD AUTO: 10 10*3/UL (ref 4.8–10.8)

## 2023-08-28 ENCOUNTER — TELEPHONE (OUTPATIENT)
Dept: ADMINISTRATIVE | Age: 28
End: 2023-08-28

## 2023-08-28 NOTE — TELEPHONE ENCOUNTER
Pt called to schedule hfu with Dr. Leandro Cox. Pt was admitted to [facility]  for sympotomatic bradycardia and hypotention on 7/18/23 and dc'd on 7/20/23. Pt was advised to f/u with Dr. Marizol Ann. Please LM with pt's alysha Conde. Pt using his phone.   [phone]

## 2024-05-29 ENCOUNTER — HOSPITAL ENCOUNTER (EMERGENCY)
Dept: HOSPITAL 83 - ED | Age: 29
Discharge: HOME | End: 2024-05-29
Payer: COMMERCIAL

## 2024-05-29 VITALS — HEIGHT: 62.99 IN | BODY MASS INDEX: 34.91 KG/M2 | WEIGHT: 197 LBS

## 2024-05-29 DIAGNOSIS — F17.210: ICD-10-CM

## 2024-05-29 DIAGNOSIS — Z88.1: ICD-10-CM

## 2024-05-29 DIAGNOSIS — L02.415: Primary | ICD-10-CM

## 2024-05-29 DIAGNOSIS — Z90.49: ICD-10-CM

## 2024-05-29 DIAGNOSIS — E43: ICD-10-CM

## 2024-05-29 DIAGNOSIS — Z79.899: ICD-10-CM

## 2024-05-29 DIAGNOSIS — Z79.2: ICD-10-CM

## 2024-08-05 ENCOUNTER — HOSPITAL ENCOUNTER (EMERGENCY)
Dept: HOSPITAL 83 - ED | Age: 29
Discharge: HOME | End: 2024-08-05
Payer: COMMERCIAL

## 2024-08-05 VITALS — BODY MASS INDEX: 34.02 KG/M2 | HEIGHT: 62.99 IN | WEIGHT: 192 LBS

## 2024-08-05 DIAGNOSIS — F17.200: ICD-10-CM

## 2024-08-05 DIAGNOSIS — J02.9: ICD-10-CM

## 2024-08-05 DIAGNOSIS — Z90.49: ICD-10-CM

## 2024-08-05 DIAGNOSIS — H66.92: Primary | ICD-10-CM

## 2024-08-05 DIAGNOSIS — Z88.1: ICD-10-CM

## 2025-03-19 ENCOUNTER — HOSPITAL ENCOUNTER (EMERGENCY)
Dept: HOSPITAL 83 - ED | Age: 30
Discharge: HOME | End: 2025-03-19
Payer: COMMERCIAL

## 2025-03-19 VITALS — HEIGHT: 62.99 IN | BODY MASS INDEX: 34.55 KG/M2 | WEIGHT: 195 LBS

## 2025-03-19 DIAGNOSIS — Z90.49: ICD-10-CM

## 2025-03-19 DIAGNOSIS — S93.402A: Primary | ICD-10-CM

## 2025-03-19 DIAGNOSIS — F17.200: ICD-10-CM

## 2025-03-19 DIAGNOSIS — Z88.1: ICD-10-CM
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
